# Patient Record
Sex: FEMALE | Race: OTHER | HISPANIC OR LATINO | URBAN - METROPOLITAN AREA
[De-identification: names, ages, dates, MRNs, and addresses within clinical notes are randomized per-mention and may not be internally consistent; named-entity substitution may affect disease eponyms.]

---

## 2018-07-24 ENCOUNTER — OUTPATIENT (OUTPATIENT)
Dept: OUTPATIENT SERVICES | Facility: HOSPITAL | Age: 48
LOS: 1 days | End: 2018-07-24

## 2018-07-24 VITALS
SYSTOLIC BLOOD PRESSURE: 110 MMHG | DIASTOLIC BLOOD PRESSURE: 60 MMHG | RESPIRATION RATE: 16 BRPM | HEIGHT: 65 IN | OXYGEN SATURATION: 98 % | WEIGHT: 184.09 LBS | HEART RATE: 72 BPM | TEMPERATURE: 97 F

## 2018-07-24 DIAGNOSIS — S52.122A DISPLACED FRACTURE OF HEAD OF LEFT RADIUS, INITIAL ENCOUNTER FOR CLOSED FRACTURE: ICD-10-CM

## 2018-07-24 DIAGNOSIS — Z98.890 OTHER SPECIFIED POSTPROCEDURAL STATES: Chronic | ICD-10-CM

## 2018-07-24 LAB
HCG SERPL-ACNC: < 5 MIU/ML — SIGNIFICANT CHANGE UP
HCT VFR BLD CALC: 47.4 % — HIGH (ref 34.5–45)
HGB BLD-MCNC: 15.2 G/DL — SIGNIFICANT CHANGE UP (ref 11.5–15.5)
MCHC RBC-ENTMCNC: 30.6 PG — SIGNIFICANT CHANGE UP (ref 27–34)
MCHC RBC-ENTMCNC: 32.1 % — SIGNIFICANT CHANGE UP (ref 32–36)
MCV RBC AUTO: 95.6 FL — SIGNIFICANT CHANGE UP (ref 80–100)
NRBC # FLD: 0 — SIGNIFICANT CHANGE UP
PLATELET # BLD AUTO: 295 K/UL — SIGNIFICANT CHANGE UP (ref 150–400)
PMV BLD: 10 FL — SIGNIFICANT CHANGE UP (ref 7–13)
RBC # BLD: 4.96 M/UL — SIGNIFICANT CHANGE UP (ref 3.8–5.2)
RBC # FLD: 12.8 % — SIGNIFICANT CHANGE UP (ref 10.3–14.5)
WBC # BLD: 10.42 K/UL — SIGNIFICANT CHANGE UP (ref 3.8–10.5)
WBC # FLD AUTO: 10.42 K/UL — SIGNIFICANT CHANGE UP (ref 3.8–10.5)

## 2018-07-24 NOTE — H&P PST ADULT - PROBLEM SELECTOR PLAN 1
Pt scheduled for left elbow radial head replacement triceps repair on 07/31/18  Preop instructions provided. Pt verbalized understanding.   Pepcid for GI prophylaxis provided   Chlorhexidine wash with instructions provided.

## 2018-07-24 NOTE — H&P PST ADULT - ASSESSMENT
48 y.o female with preop diagnosis of displaced fracture of head of left radius, lateral subluxation of left ulnohumeral joint, strain of extensor muscle, facia and tendon of left index finger at forearm level

## 2018-07-24 NOTE — H&P PST ADULT - HISTORY OF PRESENT ILLNESS
48 y.o. female with no significant PMHx, reports hx of fall 1 month ago and injury to left elbow, s/p ER visit and Xray, diagnosed with displaced fracture of head of left radius, c/o left elbow pain radiating to left arm 10/10, constant swelling to left arm, pt presents to PST for evaluation for Left Elbow Radial Head Replacement Triceps Repair on 07/31/18 48 y.o. female with no significant PMHx, reports hx of slip and fall 1 month ago and injury to left elbow, s/p ER visit and Xray, diagnosed with displaced fracture of head of left radius, c/o left elbow pain radiating to left arm 10/10, swelling to left elbow and arm, pt presents to PST for evaluation for Left Elbow Radial Head Replacement Triceps Repair on 07/31/18

## 2018-07-24 NOTE — H&P PST ADULT - NEGATIVE ENMT SYMPTOMS
no hearing difficulty/no nasal discharge/no nasal congestion/no throat pain/no dysphagia/no sinus symptoms/no post-nasal discharge

## 2018-07-24 NOTE — H&P PST ADULT - MUSCULOSKELETAL COMMENTS
left upper extremity swelling below elbow; pt has sling hx of left elbow fracture, s/p fall ~1 month ago, see HPI left elbow and left upper extremity swelling; pt has sling in place, pt able to wiggle fingers

## 2018-07-24 NOTE — H&P PST ADULT - PRO TOBACCO TYPE
reports smokes only 1-3 x months, socially/cigarettes cigarettes/reports social smoking 1-3 cigarettes months

## 2018-07-24 NOTE — H&P PST ADULT - NEGATIVE GENERAL GENITOURINARY SYMPTOMS
no flank pain L/no hematuria/normal urinary frequency/no flank pain R/no renal colic/no bladder infections/no dysuria

## 2018-07-30 ENCOUNTER — TRANSCRIPTION ENCOUNTER (OUTPATIENT)
Age: 48
End: 2018-07-30

## 2018-07-31 ENCOUNTER — OUTPATIENT (OUTPATIENT)
Dept: OUTPATIENT SERVICES | Facility: HOSPITAL | Age: 48
LOS: 1 days | Discharge: HOME | End: 2018-07-31
Payer: COMMERCIAL

## 2018-07-31 ENCOUNTER — RESULT REVIEW (OUTPATIENT)
Age: 48
End: 2018-07-31

## 2018-07-31 VITALS
TEMPERATURE: 98 F | SYSTOLIC BLOOD PRESSURE: 105 MMHG | HEART RATE: 75 BPM | OXYGEN SATURATION: 99 % | DIASTOLIC BLOOD PRESSURE: 69 MMHG | RESPIRATION RATE: 14 BRPM

## 2018-07-31 VITALS
TEMPERATURE: 96 F | SYSTOLIC BLOOD PRESSURE: 105 MMHG | OXYGEN SATURATION: 98 % | HEIGHT: 65 IN | WEIGHT: 184.09 LBS | HEART RATE: 62 BPM | RESPIRATION RATE: 16 BRPM | DIASTOLIC BLOOD PRESSURE: 71 MMHG

## 2018-07-31 DIAGNOSIS — Z98.890 OTHER SPECIFIED POSTPROCEDURAL STATES: Chronic | ICD-10-CM

## 2018-07-31 DIAGNOSIS — S52.122A DISPLACED FRACTURE OF HEAD OF LEFT RADIUS, INITIAL ENCOUNTER FOR CLOSED FRACTURE: ICD-10-CM

## 2018-07-31 PROCEDURE — 88311 DECALCIFY TISSUE: CPT | Mod: 26

## 2018-07-31 PROCEDURE — 88305 TISSUE EXAM BY PATHOLOGIST: CPT | Mod: 26

## 2018-07-31 NOTE — ASU DISCHARGE PLAN (ADULT/PEDIATRIC). - MEDICATION SUMMARY - MEDICATIONS TO TAKE
I will START or STAY ON the medications listed below when I get home from the hospital:    Percocet 5/325 oral tablet  -- 1-2 tab(s) by mouth every 4-6 hours, As Needed MDD:Maral     ISTOP [id number]  -- Caution federal law prohibits the transfer of this drug to any person other  than the person for whom it was prescribed.  May cause drowsiness.  Alcohol may intensify this effect.  Use care when operating dangerous machinery.  This prescription cannot be refilled.  This product contains acetaminophen.  Do not use  with any other product containing acetaminophen to prevent possible liver damage.  Using more of this medication than prescribed may cause serious breathing problems.    -- Indication: For pain

## 2018-07-31 NOTE — ASU DISCHARGE PLAN (ADULT/PEDIATRIC). - YOU WERE IN THE HOSPITAL FOR:
Left elbow radial head replacement, tricep repair LEFT ELBOW RADIAL HEAD REPLACEMENT; TRICEPS REPAIR, REPAIR OF LIGAMENT

## 2018-07-31 NOTE — ASU DISCHARGE PLAN (ADULT/PEDIATRIC). - ACTIVITY LEVEL
no exercise/no heavy lifting/no weight bearing no sports/gym/no heavy lifting/no exercise/no weight bearing

## 2018-07-31 NOTE — ASU DISCHARGE PLAN (ADULT/PEDIATRIC). - NOTIFY
Bleeding that does not stop Increased Irritability or Sluggishness/Persistent Nausea and Vomiting/Swelling that continues/Unable to Urinate/Pain not relieved by Medications/Numbness, color, or temperature change to extremity/Bleeding that does not stop/Fever greater than 101/Inability to Tolerate Liquids or Foods

## 2018-07-31 NOTE — ASU PREOPERATIVE ASSESSMENT, ADULT (IPARK ONLY) - LANGUAGE ASSISTANCE NEEDED
No-Patient/Caregiver offered and refused free interpretation services./Pt. felt comfortable speaking enough English

## 2018-07-31 NOTE — ASU DISCHARGE PLAN (ADULT/PEDIATRIC). - NURSING INSTRUCTIONS
AVOID TAKING ANY TYLENOL PRODUCTS WHEN TAKING YOUR PAIN MEDICATION. PAIN MEDICATION ALREADY CONTAINS TYLENOL  PAIN MEDICATION MAY CAUSE CONSTIPATION, TAKE AN OVER THE COUNTER STOOL SOFTENER TO ALLEVIATE SYMPTOMS

## 2018-07-31 NOTE — ASU DISCHARGE PLAN (ADULT/PEDIATRIC). - COMMENTS
Patient informed of the below; nothing further.    PLEASE MAKE AN APPOINTMENT TO SEE YOUR DOCTOR IN 2 WEEKS

## 2019-04-22 ENCOUNTER — OUTPATIENT (OUTPATIENT)
Dept: OUTPATIENT SERVICES | Facility: HOSPITAL | Age: 49
LOS: 1 days | End: 2019-04-22
Payer: COMMERCIAL

## 2019-04-22 VITALS
HEART RATE: 63 BPM | HEIGHT: 65 IN | OXYGEN SATURATION: 98 % | TEMPERATURE: 98 F | WEIGHT: 188.5 LBS | DIASTOLIC BLOOD PRESSURE: 75 MMHG | RESPIRATION RATE: 16 BRPM | SYSTOLIC BLOOD PRESSURE: 111 MMHG

## 2019-04-22 DIAGNOSIS — S42.402A UNSPECIFIED FRACTURE OF LOWER END OF LEFT HUMERUS, INITIAL ENCOUNTER FOR CLOSED FRACTURE: ICD-10-CM

## 2019-04-22 DIAGNOSIS — Z98.891 HISTORY OF UTERINE SCAR FROM PREVIOUS SURGERY: Chronic | ICD-10-CM

## 2019-04-22 DIAGNOSIS — Z98.890 OTHER SPECIFIED POSTPROCEDURAL STATES: Chronic | ICD-10-CM

## 2019-04-22 DIAGNOSIS — Z29.9 ENCOUNTER FOR PROPHYLACTIC MEASURES, UNSPECIFIED: ICD-10-CM

## 2019-04-22 DIAGNOSIS — S52.123A DISPLACED FRACTURE OF HEAD OF UNSPECIFIED RADIUS, INITIAL ENCOUNTER FOR CLOSED FRACTURE: Chronic | ICD-10-CM

## 2019-04-22 DIAGNOSIS — M65.822 OTHER SYNOVITIS AND TENOSYNOVITIS, LEFT UPPER ARM: ICD-10-CM

## 2019-04-22 LAB
ANION GAP SERPL CALC-SCNC: 11 MMOL/L — SIGNIFICANT CHANGE UP (ref 5–17)
APPEARANCE UR: CLEAR — SIGNIFICANT CHANGE UP
BILIRUB UR-MCNC: NEGATIVE — SIGNIFICANT CHANGE UP
BUN SERPL-MCNC: 14 MG/DL — SIGNIFICANT CHANGE UP (ref 7–23)
CALCIUM SERPL-MCNC: 9.6 MG/DL — SIGNIFICANT CHANGE UP (ref 8.4–10.5)
CHLORIDE SERPL-SCNC: 107 MMOL/L — SIGNIFICANT CHANGE UP (ref 96–108)
CO2 SERPL-SCNC: 23 MMOL/L — SIGNIFICANT CHANGE UP (ref 22–31)
COLOR SPEC: SIGNIFICANT CHANGE UP
CREAT SERPL-MCNC: 0.89 MG/DL — SIGNIFICANT CHANGE UP (ref 0.5–1.3)
DIFF PNL FLD: SIGNIFICANT CHANGE UP
GLUCOSE SERPL-MCNC: 106 MG/DL — HIGH (ref 70–99)
GLUCOSE UR QL: NEGATIVE — SIGNIFICANT CHANGE UP
HBA1C BLD-MCNC: 6 % — HIGH (ref 4–5.6)
HCT VFR BLD CALC: 46 % — HIGH (ref 34.5–45)
HGB BLD-MCNC: 15.1 G/DL — SIGNIFICANT CHANGE UP (ref 11.5–15.5)
KETONES UR-MCNC: NEGATIVE — SIGNIFICANT CHANGE UP
LEUKOCYTE ESTERASE UR-ACNC: NEGATIVE — SIGNIFICANT CHANGE UP
MCHC RBC-ENTMCNC: 31.3 PG — SIGNIFICANT CHANGE UP (ref 27–34)
MCHC RBC-ENTMCNC: 32.8 GM/DL — SIGNIFICANT CHANGE UP (ref 32–36)
MCV RBC AUTO: 95.2 FL — SIGNIFICANT CHANGE UP (ref 80–100)
NITRITE UR-MCNC: NEGATIVE — SIGNIFICANT CHANGE UP
PH UR: 6.5 — SIGNIFICANT CHANGE UP (ref 5–8)
PLATELET # BLD AUTO: 263 K/UL — SIGNIFICANT CHANGE UP (ref 150–400)
POTASSIUM SERPL-MCNC: 4.1 MMOL/L — SIGNIFICANT CHANGE UP (ref 3.5–5.3)
POTASSIUM SERPL-SCNC: 4.1 MMOL/L — SIGNIFICANT CHANGE UP (ref 3.5–5.3)
PROT UR-MCNC: NEGATIVE — SIGNIFICANT CHANGE UP
RBC # BLD: 4.83 M/UL — SIGNIFICANT CHANGE UP (ref 3.8–5.2)
RBC # FLD: 11.9 % — SIGNIFICANT CHANGE UP (ref 10.3–14.5)
SODIUM SERPL-SCNC: 141 MMOL/L — SIGNIFICANT CHANGE UP (ref 135–145)
SP GR SPEC: 1.01 — SIGNIFICANT CHANGE UP (ref 1.01–1.02)
UROBILINOGEN FLD QL: SIGNIFICANT CHANGE UP
WBC # BLD: 8.28 K/UL — SIGNIFICANT CHANGE UP (ref 3.8–10.5)
WBC # FLD AUTO: 8.28 K/UL — SIGNIFICANT CHANGE UP (ref 3.8–10.5)

## 2019-04-22 PROCEDURE — G0463: CPT

## 2019-04-22 PROCEDURE — 87086 URINE CULTURE/COLONY COUNT: CPT

## 2019-04-22 PROCEDURE — 83036 HEMOGLOBIN GLYCOSYLATED A1C: CPT

## 2019-04-22 PROCEDURE — 81003 URINALYSIS AUTO W/O SCOPE: CPT

## 2019-04-22 PROCEDURE — 80048 BASIC METABOLIC PNL TOTAL CA: CPT

## 2019-04-22 PROCEDURE — 85027 COMPLETE CBC AUTOMATED: CPT

## 2019-04-22 RX ORDER — SODIUM CHLORIDE 9 MG/ML
3 INJECTION INTRAMUSCULAR; INTRAVENOUS; SUBCUTANEOUS EVERY 8 HOURS
Qty: 0 | Refills: 0 | Status: DISCONTINUED | OUTPATIENT
Start: 2019-04-24 | End: 2019-04-24

## 2019-04-22 RX ORDER — CEFAZOLIN SODIUM 1 G
2000 VIAL (EA) INJECTION ONCE
Qty: 0 | Refills: 0 | Status: DISCONTINUED | OUTPATIENT
Start: 2019-04-24 | End: 2019-04-24

## 2019-04-22 NOTE — H&P PST ADULT - NEUROLOGICAL DETAILS
responds to verbal commands/alert and oriented x 3/normal strength sensation intact/alert and oriented x 3/responds to verbal commands/normal strength

## 2019-04-22 NOTE — H&P PST ADULT - NSICDXPASTMEDICALHX_GEN_ALL_CORE_FT
PAST MEDICAL HISTORY:  Displaced fracture of head of left radius PAST MEDICAL HISTORY:  Displaced fracture of head of left radius fracture dislocation of left elbow and avulsion fracture of the coronoid and triceps rupture 6/27/18

## 2019-04-22 NOTE — H&P PST ADULT - MUSCULOSKELETAL
details… detailed exam joint swelling/diminished strength/decreased ROM/joint erythema left elbow/decreased ROM due to pain/decreased ROM

## 2019-04-22 NOTE — H&P PST ADULT - ASSESSMENT
CAPRINI SCORE [CLOT]    AGE RELATED RISK FACTORS                                                       MOBILITY RELATED FACTORS  [ x] Age 41-60 years                                            (1 Point)                  [ ] Bed rest                                                        (1 Point)  [ ] Age: 61-74 years                                           (2 Points)                 [ ] Plaster cast                                                   (2 Points)  [ ] Age= 75 years                                              (3 Points)                 [ ] Bed bound for more than 72 hours                 (2 Points)    DISEASE RELATED RISK FACTORS                                               GENDER SPECIFIC FACTORS  [ ] Edema in the lower extremities                       (1 Point)                  [ ] Pregnancy                                                     (1 Point)  [ ] Varicose veins                                               (1 Point)                  [ ] Post-partum < 6 weeks                                   (1 Point)             [ x] BMI > 25 Kg/m2                                            (1 Point)                  [ ] Hormonal therapy  or oral contraception          (1 Point)                 [ ] Sepsis (in the previous month)                        (1 Point)                  [ ] History of pregnancy complications                 (1 point)  [ ] Pneumonia or serious lung disease                                               [ ] Unexplained or recurrent                     (1 Point)           (in the previous month)                               (1 Point)  [ ] Abnormal pulmonary function test                     (1 Point)                 SURGERY RELATED RISK FACTORS  [ ] Acute myocardial infarction                              (1 Point)                 [ ]  Section                                             (1 Point)  [ ] Congestive heart failure (in the previous month)  (1 Point)               [ ] Minor surgery                                                  (1 Point)   [ ] Inflammatory bowel disease                             (1 Point)                 [ ] Arthroscopic surgery                                        (2 Points)  [ ] Central venous access                                      (2 Points)                [x ] General surgery lasting more than 45 minutes   (2 Points)       [ ] Stroke (in the previous month)                          (5 Points)               [ ] Elective arthroplasty                                         (5 Points)                                                                                                                                               HEMATOLOGY RELATED FACTORS                                                 TRAUMA RELATED RISK FACTORS  [ ] Prior episodes of VTE                                     (3 Points)                [ ] Fracture of the hip, pelvis, or leg                       (5 Points)  [ ] Positive family history for VTE                         (3 Points)                 [ ] Acute spinal cord injury (in the previous month)  (5 Points)  [ ] Prothrombin 63728 A                                     (3 Points)                 [ ] Paralysis  (less than 1 month)                             (5 Points)  [ ] Factor V Leiden                                             (3 Points)                  [ ] Multiple Trauma within 1 month                        (5 Points)  [ ] Lupus anticoagulants                                     (3 Points)                                                           [ ] Anticardiolipin antibodies                               (3 Points)                                                       [ ] High homocysteine in the blood                      (3 Points)                                             [ ] Other congenital or acquired thrombophilia      (3 Points)                                                [ ] Heparin induced thrombocytopenia                  (3 Points)                                          Total Score [      4    ]

## 2019-04-22 NOTE — H&P PST ADULT - SOURCE OF INFORMATION, PROFILE
patient patient/Pt.'s primary language is [place], prefers I communicate in [place] during PST, declined telephonic

## 2019-04-22 NOTE — H&P PST ADULT - NSICDXPROBLEM_GEN_ALL_CORE_FT
PROBLEM DIAGNOSES  Problem: Left elbow fracture  Assessment and Plan: Left elbow contracture release, ulnar nerve release  Pre-op instructions, including chlorhexidine soap, provided - all questions answered    Problem: Prophylactic measure  Assessment and Plan: The Caprini score indicates this patient is at risk for a VTE event (score 3-5).  Most surgical patients in this group would benefit from pharmacologic prophylaxis.  The surgical team will determine the balance between VTE risk and bleeding risk

## 2019-04-22 NOTE — H&P PST ADULT - NSICDXPASTSURGICALHX_GEN_ALL_CORE_FT
PAST SURGICAL HISTORY:  H/O hand surgery left hand 5th finger fracture repair PAST SURGICAL HISTORY:  H/O hand surgery left hand 5th finger fracture repair    Radial head fracture Radial head replacement, repair of coronoid fracture, repair of lateral ligaments, and repair of triceps rupture of the left elbow on 18.    S/P  1990

## 2019-04-22 NOTE — H&P PST ADULT - HISTORY OF PRESENT ILLNESS
48 y.o. female with no significant PMHx, reports hx of slip and fall 1 month ago and injury to left elbow, s/p ER visit and Xray, diagnosed with displaced fracture of head of left radius, c/o left elbow pain radiating to left arm 10/10, swelling to left elbow and arm, pt presents to PST for evaluation for Left Elbow Radial Head Replacement Triceps Repair on 07/31/18 48 yo [place] speaking female, No significant PMH, tripped over a box and suffered a fracture dislocation of left elbow and avulsion fracture of the coronoid and triceps rupture on 6/27/18 s/p Radial head replacement, repair of coronoid fracture, repair of lateral ligaments, and repair of triceps rupture of the left elbow on 7/31/18. Pt. had physical therapy post-op but reports her elbow is locked at bent position, unable to stretch all the way. Pt. returns to [facility] for scheduled Left Elbow Contracture Release, Ulnar Nerve Release on 4/24/19. Pt. being treated for UTI since 4/19/19, had chills over the weekend but denies further dysuria, fever, chest pain, SOB, changes in bowel/habits. Pt. also developed cold sore on left upper lip over the weekend. E-mail was sent to Dr. Schmidt to make him aware.

## 2019-04-22 NOTE — H&P PST ADULT - RS GEN PE MLT RESP DETAILS PC
respirations non-labored/breath sounds equal/airway patent/good air movement/clear to auscultation bilaterally clear to auscultation bilaterally/no wheezes/no rhonchi/no rales/respirations non-labored

## 2019-04-22 NOTE — H&P PST ADULT - NEGATIVE ENMT SYMPTOMS
no ear pain/no nasal congestion/no sinus symptoms/no nasal discharge/no tinnitus/no hearing difficulty/no vertigo

## 2019-04-22 NOTE — H&P PST ADULT - PRO TOBACCO TYPE
cigarettes/reports social smoking 1-3 cigarettes months was a social smoker, 3 cigarettes a month/cigarettes

## 2019-04-23 ENCOUNTER — TRANSCRIPTION ENCOUNTER (OUTPATIENT)
Age: 49
End: 2019-04-23

## 2019-04-23 PROBLEM — S52.122A DISPLACED FRACTURE OF HEAD OF LEFT RADIUS, INITIAL ENCOUNTER FOR CLOSED FRACTURE: Chronic | Status: ACTIVE | Noted: 2018-07-24

## 2019-04-23 LAB
CULTURE RESULTS: SIGNIFICANT CHANGE UP
SPECIMEN SOURCE: SIGNIFICANT CHANGE UP

## 2019-04-24 ENCOUNTER — INPATIENT (INPATIENT)
Facility: HOSPITAL | Age: 49
LOS: 0 days | Discharge: HOME | DRG: 497 | End: 2019-04-25
Attending: ORTHOPAEDIC SURGERY | Admitting: ORTHOPAEDIC SURGERY
Payer: COMMERCIAL

## 2019-04-24 VITALS
HEART RATE: 64 BPM | SYSTOLIC BLOOD PRESSURE: 107 MMHG | HEIGHT: 65 IN | TEMPERATURE: 98 F | RESPIRATION RATE: 16 BRPM | DIASTOLIC BLOOD PRESSURE: 75 MMHG | OXYGEN SATURATION: 97 % | WEIGHT: 188.05 LBS

## 2019-04-24 DIAGNOSIS — Z98.890 OTHER SPECIFIED POSTPROCEDURAL STATES: Chronic | ICD-10-CM

## 2019-04-24 DIAGNOSIS — Z98.891 HISTORY OF UTERINE SCAR FROM PREVIOUS SURGERY: Chronic | ICD-10-CM

## 2019-04-24 DIAGNOSIS — M65.822 OTHER SYNOVITIS AND TENOSYNOVITIS, LEFT UPPER ARM: ICD-10-CM

## 2019-04-24 DIAGNOSIS — S52.123A DISPLACED FRACTURE OF HEAD OF UNSPECIFIED RADIUS, INITIAL ENCOUNTER FOR CLOSED FRACTURE: Chronic | ICD-10-CM

## 2019-04-24 LAB
GLUCOSE BLDC GLUCOMTR-MCNC: 65 MG/DL — LOW (ref 70–99)
HCG UR QL: NEGATIVE — SIGNIFICANT CHANGE UP

## 2019-04-24 RX ORDER — ONDANSETRON 8 MG/1
4 TABLET, FILM COATED ORAL ONCE
Qty: 0 | Refills: 0 | Status: COMPLETED | OUTPATIENT
Start: 2019-04-24 | End: 2019-04-24

## 2019-04-24 RX ORDER — SODIUM CHLORIDE 9 MG/ML
1000 INJECTION INTRAMUSCULAR; INTRAVENOUS; SUBCUTANEOUS
Qty: 0 | Refills: 0 | Status: DISCONTINUED | OUTPATIENT
Start: 2019-04-24 | End: 2019-04-24

## 2019-04-24 RX ORDER — DIPHENHYDRAMINE HCL 50 MG
25 CAPSULE ORAL AT BEDTIME
Qty: 0 | Refills: 0 | Status: DISCONTINUED | OUTPATIENT
Start: 2019-04-24 | End: 2019-04-25

## 2019-04-24 RX ORDER — CHLORHEXIDINE GLUCONATE 213 G/1000ML
1 SOLUTION TOPICAL ONCE
Qty: 0 | Refills: 0 | Status: COMPLETED | OUTPATIENT
Start: 2019-04-24 | End: 2019-04-24

## 2019-04-24 RX ORDER — OXYCODONE HYDROCHLORIDE 5 MG/1
5 TABLET ORAL EVERY 6 HOURS
Qty: 0 | Refills: 0 | Status: DISCONTINUED | OUTPATIENT
Start: 2019-04-24 | End: 2019-04-24

## 2019-04-24 RX ORDER — CEFAZOLIN SODIUM 1 G
2000 VIAL (EA) INJECTION EVERY 8 HOURS
Qty: 0 | Refills: 0 | Status: COMPLETED | OUTPATIENT
Start: 2019-04-24 | End: 2019-04-24

## 2019-04-24 RX ORDER — LIDOCAINE HCL 20 MG/ML
0.2 VIAL (ML) INJECTION ONCE
Qty: 0 | Refills: 0 | Status: COMPLETED | OUTPATIENT
Start: 2019-04-24 | End: 2019-04-24

## 2019-04-24 RX ORDER — HYDROMORPHONE HYDROCHLORIDE 2 MG/ML
0.5 INJECTION INTRAMUSCULAR; INTRAVENOUS; SUBCUTANEOUS
Qty: 0 | Refills: 0 | Status: DISCONTINUED | OUTPATIENT
Start: 2019-04-24 | End: 2019-04-24

## 2019-04-24 RX ORDER — OXYCODONE HYDROCHLORIDE 5 MG/1
10 TABLET ORAL EVERY 4 HOURS
Qty: 0 | Refills: 0 | Status: DISCONTINUED | OUTPATIENT
Start: 2019-04-24 | End: 2019-04-25

## 2019-04-24 RX ORDER — SODIUM CHLORIDE 9 MG/ML
1000 INJECTION INTRAMUSCULAR; INTRAVENOUS; SUBCUTANEOUS
Qty: 0 | Refills: 0 | Status: DISCONTINUED | OUTPATIENT
Start: 2019-04-24 | End: 2019-04-25

## 2019-04-24 RX ORDER — DOCUSATE SODIUM 100 MG
100 CAPSULE ORAL THREE TIMES A DAY
Qty: 0 | Refills: 0 | Status: DISCONTINUED | OUTPATIENT
Start: 2019-04-24 | End: 2019-04-25

## 2019-04-24 RX ORDER — ACETAMINOPHEN 500 MG
650 TABLET ORAL EVERY 6 HOURS
Qty: 0 | Refills: 0 | Status: DISCONTINUED | OUTPATIENT
Start: 2019-04-24 | End: 2019-04-24

## 2019-04-24 RX ORDER — ONDANSETRON 8 MG/1
4 TABLET, FILM COATED ORAL EVERY 6 HOURS
Qty: 0 | Refills: 0 | Status: DISCONTINUED | OUTPATIENT
Start: 2019-04-24 | End: 2019-04-25

## 2019-04-24 RX ORDER — OXYCODONE HYDROCHLORIDE 5 MG/1
5 TABLET ORAL EVERY 4 HOURS
Qty: 0 | Refills: 0 | Status: DISCONTINUED | OUTPATIENT
Start: 2019-04-24 | End: 2019-04-25

## 2019-04-24 RX ORDER — GABAPENTIN 400 MG/1
300 CAPSULE ORAL AT BEDTIME
Qty: 0 | Refills: 0 | Status: DISCONTINUED | OUTPATIENT
Start: 2019-04-24 | End: 2019-04-25

## 2019-04-24 RX ORDER — ACETAMINOPHEN 500 MG
650 TABLET ORAL EVERY 6 HOURS
Qty: 0 | Refills: 0 | Status: DISCONTINUED | OUTPATIENT
Start: 2019-04-24 | End: 2019-04-25

## 2019-04-24 RX ADMIN — CHLORHEXIDINE GLUCONATE 1 APPLICATION(S): 213 SOLUTION TOPICAL at 07:06

## 2019-04-24 RX ADMIN — OXYCODONE HYDROCHLORIDE 10 MILLIGRAM(S): 5 TABLET ORAL at 21:13

## 2019-04-24 RX ADMIN — ONDANSETRON 4 MILLIGRAM(S): 8 TABLET, FILM COATED ORAL at 09:37

## 2019-04-24 RX ADMIN — Medication 100 MILLIGRAM(S): at 15:10

## 2019-04-24 RX ADMIN — OXYCODONE HYDROCHLORIDE 10 MILLIGRAM(S): 5 TABLET ORAL at 15:47

## 2019-04-24 RX ADMIN — SODIUM CHLORIDE 3 MILLILITER(S): 9 INJECTION INTRAMUSCULAR; INTRAVENOUS; SUBCUTANEOUS at 07:06

## 2019-04-24 RX ADMIN — GABAPENTIN 300 MILLIGRAM(S): 400 CAPSULE ORAL at 21:14

## 2019-04-24 RX ADMIN — Medication 100 MILLIGRAM(S): at 21:14

## 2019-04-24 RX ADMIN — OXYCODONE HYDROCHLORIDE 10 MILLIGRAM(S): 5 TABLET ORAL at 21:45

## 2019-04-24 RX ADMIN — SODIUM CHLORIDE 75 MILLILITER(S): 9 INJECTION INTRAMUSCULAR; INTRAVENOUS; SUBCUTANEOUS at 10:33

## 2019-04-24 RX ADMIN — Medication 100 MILLIGRAM(S): at 14:07

## 2019-04-24 RX ADMIN — OXYCODONE HYDROCHLORIDE 10 MILLIGRAM(S): 5 TABLET ORAL at 14:06

## 2019-04-24 RX ADMIN — Medication 0.2 MILLILITER(S): at 07:06

## 2019-04-24 NOTE — PRE-ANESTHESIA EVALUATION ADULT - NSPREOPDXFT_GEN_ALL_CORE
Review of Systems   Constitutional: Positive for fatigue  Negative for appetite change and fever  HENT: Negative  Negative for hearing loss, tinnitus, trouble swallowing and voice change  Eyes: Negative  Negative for photophobia and pain  Respiratory: Negative  Negative for shortness of breath  Cardiovascular: Negative  Negative for palpitations  Gastrointestinal: Negative  Negative for nausea and vomiting  Endocrine: Negative  Negative for cold intolerance and heat intolerance  Genitourinary: Negative  Negative for dysuria, frequency and urgency  Musculoskeletal: Negative  Negative for myalgias and neck pain  Skin: Negative  Negative for rash  Neurological: Positive for headaches  Negative for dizziness, tremors, seizures, syncope, facial asymmetry, speech difficulty, weakness, light-headedness and numbness  Hematological: Negative  Does not bruise/bleed easily  Psychiatric/Behavioral: Positive for confusion and sleep disturbance  Negative for hallucinations  left elbow contracture

## 2019-04-24 NOTE — OCCUPATIONAL THERAPY INITIAL EVALUATION ADULT - RANGE OF MOTION EXAMINATION, UPPER EXTREMITY
Right UE Active ROM was WNL (within normal limits)/PROM L wrist/digits/shoulder WFL (nerve block in place-unable to actively move), PROM L elbow 30-70*

## 2019-04-24 NOTE — OCCUPATIONAL THERAPY INITIAL EVALUATION ADULT - DIAGNOSIS, OT EVAL
Patient presents with decreased ROM ,coordination, strength, impacting ability to perform ADLs and functional mobility

## 2019-04-24 NOTE — OCCUPATIONAL THERAPY INITIAL EVALUATION ADULT - STRENGTHENING, PT EVAL
Patient will increase strength in LUE by 1/2 grade in two weeks to facilitate increased ability to perform ADLs and functional mobility

## 2019-04-24 NOTE — OCCUPATIONAL THERAPY INITIAL EVALUATION ADULT - PERTINENT HX OF CURRENT PROBLEM, REHAB EVAL
50 yo F tripped over a box and suffered a fracture dislocation of left elbow and avulsion fracture of the coronoid and triceps rupture on 6/27/18 s/p Radial head replacement, repair of coronoid fracture, repair of lateral ligaments, and repair of triceps rupture of the left elbow on 7/31/18. Pt. had physical therapy post-op but reports her elbow is locked at bent position, unable to stretch all the way. Pt. returns to [facility] for scheduled L Elbow Contracture Release, Ulnar Nerve Release on 4/24/19.

## 2019-04-24 NOTE — CHART NOTE - NSCHARTNOTEFT_GEN_A_CORE
Patient seen on 7 tower with family at bedside. Resting without complaints.  Denies Chest Pain, SOB, N/V.    T(C): 36.5 (04-24-19 @ 16:27), Max: 36.7 (04-24-19 @ 06:27)  HR: 78 (04-24-19 @ 16:27) (64 - 83)  BP: 110/75 (04-24-19 @ 16:27) (98/57 - 110/75)  RR: 18 (04-24-19 @ 16:27) (12 - 18)  SpO2: 94% (04-24-19 @ 16:27) (91% - 100%)  Wt(kg): --    Exam:  Alert and [place], No Acute Distress  Card: +S1/S2, RRR  Pulm: CTAB  Laterality: L elbow ace wrap c/d/i, in sling  HV x 1; maintaining good suction  Compartments soft, non-tender bilaterally  Fingers warm, brisk capillary refill; unable to assess NV status 2/2 supraclavicular block  + Radial pulse    Xray: Intra-op images in chart             A/P: Patient is a 49y Female S/p L Elbow contracture release, ulnar nerve transposition, and TAM. VSS. NAD  -PT/OT-WBAT to LUE; ROM as tolerated; Sling for comfort  -IS encouraged  -DVT PPx- SCD's  -Pain Control PRN  -FU AM Labs  -OOB to chair  -Continue Current Tx  -Dispo planning- most likely home    Lubna Fox PA-C  Team Pager #[phone]

## 2019-04-24 NOTE — OCCUPATIONAL THERAPY INITIAL EVALUATION ADULT - RANGE OF MOTION, PT EVAL
Pt to tolerate increased PROM to L elbow by 30* within 2 weeks to increased ability to perform ADLs/IADLS

## 2019-04-25 ENCOUNTER — TRANSCRIPTION ENCOUNTER (OUTPATIENT)
Age: 49
End: 2019-04-25

## 2019-04-25 VITALS
RESPIRATION RATE: 16 BRPM | TEMPERATURE: 98 F | SYSTOLIC BLOOD PRESSURE: 113 MMHG | OXYGEN SATURATION: 96 % | DIASTOLIC BLOOD PRESSURE: 68 MMHG | HEART RATE: 74 BPM

## 2019-04-25 LAB
ANION GAP SERPL CALC-SCNC: 10 MMOL/L — SIGNIFICANT CHANGE UP (ref 5–17)
BASOPHILS # BLD AUTO: 0.02 K/UL — SIGNIFICANT CHANGE UP (ref 0–0.2)
BASOPHILS NFR BLD AUTO: 0.1 % — SIGNIFICANT CHANGE UP (ref 0–2)
BUN SERPL-MCNC: 10 MG/DL — SIGNIFICANT CHANGE UP (ref 7–23)
CALCIUM SERPL-MCNC: 8.5 MG/DL — SIGNIFICANT CHANGE UP (ref 8.4–10.5)
CHLORIDE SERPL-SCNC: 107 MMOL/L — SIGNIFICANT CHANGE UP (ref 96–108)
CO2 SERPL-SCNC: 22 MMOL/L — SIGNIFICANT CHANGE UP (ref 22–31)
CREAT SERPL-MCNC: 0.68 MG/DL — SIGNIFICANT CHANGE UP (ref 0.5–1.3)
EOSINOPHIL # BLD AUTO: 0 K/UL — SIGNIFICANT CHANGE UP (ref 0–0.5)
EOSINOPHIL NFR BLD AUTO: 0 % — SIGNIFICANT CHANGE UP (ref 0–6)
GLUCOSE SERPL-MCNC: 130 MG/DL — HIGH (ref 70–99)
HCT VFR BLD CALC: 41.2 % — SIGNIFICANT CHANGE UP (ref 34.5–45)
HGB BLD-MCNC: 13.1 G/DL — SIGNIFICANT CHANGE UP (ref 11.5–15.5)
IMM GRANULOCYTES NFR BLD AUTO: 0.3 % — SIGNIFICANT CHANGE UP (ref 0–1.5)
LYMPHOCYTES # BLD AUTO: 11.8 % — LOW (ref 13–44)
LYMPHOCYTES # BLD AUTO: 2.05 K/UL — SIGNIFICANT CHANGE UP (ref 1–3.3)
MCHC RBC-ENTMCNC: 31 PG — SIGNIFICANT CHANGE UP (ref 27–34)
MCHC RBC-ENTMCNC: 31.8 GM/DL — LOW (ref 32–36)
MCV RBC AUTO: 97.4 FL — SIGNIFICANT CHANGE UP (ref 80–100)
MONOCYTES # BLD AUTO: 1.25 K/UL — HIGH (ref 0–0.9)
MONOCYTES NFR BLD AUTO: 7.2 % — SIGNIFICANT CHANGE UP (ref 2–14)
NEUTROPHILS # BLD AUTO: 13.96 K/UL — HIGH (ref 1.8–7.4)
NEUTROPHILS NFR BLD AUTO: 80.6 % — HIGH (ref 43–77)
PLATELET # BLD AUTO: 225 K/UL — SIGNIFICANT CHANGE UP (ref 150–400)
POTASSIUM SERPL-MCNC: 4.7 MMOL/L — SIGNIFICANT CHANGE UP (ref 3.5–5.3)
POTASSIUM SERPL-SCNC: 4.7 MMOL/L — SIGNIFICANT CHANGE UP (ref 3.5–5.3)
RBC # BLD: 4.23 M/UL — SIGNIFICANT CHANGE UP (ref 3.8–5.2)
RBC # FLD: 12.1 % — SIGNIFICANT CHANGE UP (ref 10.3–14.5)
SODIUM SERPL-SCNC: 139 MMOL/L — SIGNIFICANT CHANGE UP (ref 135–145)
WBC # BLD: 17.34 K/UL — HIGH (ref 3.8–10.5)
WBC # FLD AUTO: 17.34 K/UL — HIGH (ref 3.8–10.5)

## 2019-04-25 PROCEDURE — 80048 BASIC METABOLIC PNL TOTAL CA: CPT

## 2019-04-25 PROCEDURE — 81025 URINE PREGNANCY TEST: CPT

## 2019-04-25 PROCEDURE — 97165 OT EVAL LOW COMPLEX 30 MIN: CPT

## 2019-04-25 PROCEDURE — 85027 COMPLETE CBC AUTOMATED: CPT

## 2019-04-25 PROCEDURE — 97530 THERAPEUTIC ACTIVITIES: CPT

## 2019-04-25 PROCEDURE — 97535 SELF CARE MNGMENT TRAINING: CPT

## 2019-04-25 PROCEDURE — 82962 GLUCOSE BLOOD TEST: CPT

## 2019-04-25 RX ORDER — SODIUM CHLORIDE 9 MG/ML
500 INJECTION INTRAMUSCULAR; INTRAVENOUS; SUBCUTANEOUS ONCE
Qty: 0 | Refills: 0 | Status: COMPLETED | OUTPATIENT
Start: 2019-04-25 | End: 2019-04-25

## 2019-04-25 RX ORDER — ACETAMINOPHEN 500 MG
2 TABLET ORAL
Qty: 0 | Refills: 0 | DISCHARGE
Start: 2019-04-25

## 2019-04-25 RX ORDER — OXYCODONE HYDROCHLORIDE 5 MG/1
1 TABLET ORAL
Qty: 60 | Refills: 0
Start: 2019-04-25

## 2019-04-25 RX ORDER — DOCUSATE SODIUM 100 MG
1 CAPSULE ORAL
Qty: 0 | Refills: 0 | DISCHARGE
Start: 2019-04-25

## 2019-04-25 RX ORDER — TRAMADOL HYDROCHLORIDE 50 MG/1
1 TABLET ORAL
Qty: 0 | Refills: 0 | COMMUNITY

## 2019-04-25 RX ADMIN — OXYCODONE HYDROCHLORIDE 10 MILLIGRAM(S): 5 TABLET ORAL at 13:18

## 2019-04-25 RX ADMIN — Medication 1 TABLET(S): at 13:20

## 2019-04-25 RX ADMIN — SODIUM CHLORIDE 1000 MILLILITER(S): 9 INJECTION INTRAMUSCULAR; INTRAVENOUS; SUBCUTANEOUS at 06:04

## 2019-04-25 RX ADMIN — OXYCODONE HYDROCHLORIDE 10 MILLIGRAM(S): 5 TABLET ORAL at 14:29

## 2019-04-25 RX ADMIN — Medication 100 MILLIGRAM(S): at 06:04

## 2019-04-25 RX ADMIN — SODIUM CHLORIDE 75 MILLILITER(S): 9 INJECTION INTRAMUSCULAR; INTRAVENOUS; SUBCUTANEOUS at 06:04

## 2019-04-25 RX ADMIN — Medication 100 MILLIGRAM(S): at 13:19

## 2019-04-25 NOTE — PROGRESS NOTE ADULT - SUBJECTIVE AND OBJECTIVE BOX
Patient seen and examined. Pain controlled.    Vital Signs Last 24 Hrs  T(C): 36.7 (25 Apr 2019 04:35), Max: 37.1 (24 Apr 2019 21:16)  T(F): 98 (25 Apr 2019 04:35), Max: 98.7 (24 Apr 2019 21:16)  HR: 74 (25 Apr 2019 04:35) (64 - 93)  BP: 91/60 (25 Apr 2019 04:35) (91/60 - 110/75)  BP(mean): 76 (24 Apr 2019 15:00) (72 - 82)  RR: 16 (25 Apr 2019 04:35) (12 - 18)  SpO2: 94% (25 Apr 2019 04:35) (91% - 100%)    Physical exam  Gen: NAD  LUE: Dressing clean, dry, and intact. +HMV.  Pt with minimal weak flexion all digits, limited exam 2/2 block. Decreased sensation entire hand compared to R.  +RA, capillary refill brisk. Compartments soft and nontender.    49F s/p L elbow contracture capsular release/ulnar decompression POD# 1    WBAT LUE, full ROM in sling  Pain control  DVT prophylaxis venodynes  will d/w attending regarding drain output  PT  Discharge planning

## 2019-04-25 NOTE — DISCHARGE NOTE PROVIDER - CARE PROVIDER_API CALL
Dominick Schmidt)  Orthopaedic Surgery  [address] [place] [address], [address]  [place] [address]  Phone: [phone]  Fax: [phone]  Follow Up Time:

## 2019-04-25 NOTE — DISCHARGE NOTE PROVIDER - NSDCFUADDINST_GEN_ALL_CORE_FT
Please follow up with Dr. Schmidt 7-10 days after your discharge from the hospital (call for appointment).  OT-weight bearing as tolerated, full active and passive range of motion allowed.  Please start OT on 4/26/19 (Rx given).  Keep dressing clean and intact, have doctor remove staples post op day 14 and apply steristrips if applicable.  Please follow up with your PMD within 1 month for routine checkup.

## 2019-04-25 NOTE — DISCHARGE NOTE NURSING/CASE MANAGEMENT/SOCIAL WORK - NSDCDPATPORTLINK_GEN_ALL_CORE
You can access the [facility] Portal, offered by [facility], by registering with the following website: [email or web address]

## 2019-04-25 NOTE — DISCHARGE NOTE PROVIDER - NSDCACTIVITY_GEN_ALL_CORE
Walking - Outdoors allowed/Stairs allowed/Walking - Indoors allowed/No heavy lifting/straining/Do not make important decisions/Do not drive or operate machinery/Showering allowed

## 2019-04-25 NOTE — CHART NOTE - NSCHARTNOTEFT_GEN_A_CORE
Patient visited for drain pull. Hemostasis achieved, patient tolerated well, tip intact. 4x4 Dsg placed.    Lavelle Steinberg PA-C  Team Pager: #[phone]

## 2019-04-25 NOTE — DISCHARGE NOTE PROVIDER - HOSPITAL COURSE
This is a 49 year old female with history of L elbow terrible triad with repair admitted to [facility] on 4/24/19 for operative release of elbow contracture with nerve transposition.  Surgery was uncomplicated.  Evaluated and treated by OT, who recommended home with outpatient OT.  Remain of stay unremarkable, and patient discharged home.

## 2019-04-25 NOTE — PROGRESS NOTE ADULT - SUBJECTIVE AND OBJECTIVE BOX
Day _1__ of Anesthesia Pain Management Service    SUBJECTIVE:  Pain Scale Score	At rest: ___ 	With Activity: ___ 	[ x ] Refer to charted pain scores    Supraclavicular Block    MEDICATIONS  (STANDING):  docusate sodium 100 milliGRAM(s) Oral three times a day  gabapentin 300 milliGRAM(s) Oral at bedtime  multivitamin 1 Tablet(s) Oral daily  sodium chloride 0.9%. 1000 milliLiter(s) (75 mL/Hr) IV Continuous <Continuous>    MEDICATIONS  (PRN):  acetaminophen   Tablet .. 650 milliGRAM(s) Oral every 6 hours PRN Temp greater or equal to 38C (100.4F), Mild Pain (1 - 3)  diphenhydrAMINE 25 milliGRAM(s) Oral at bedtime PRN Insomnia  ondansetron Injectable 4 milliGRAM(s) IV Push every 6 hours PRN Nausea and/or Vomiting  oxyCODONE    IR 10 milliGRAM(s) Oral every 4 hours PRN Severe Pain (7 - 10)  oxyCODONE    IR 5 milliGRAM(s) Oral every 4 hours PRN Moderate Pain (4 - 6)      OBJECTIVE:    Assessment of Catheter Site:	[ ] Left	[ ] Right  [ ] Femoral	      [ ] Saphenous   [ ] Supraclavicular   [ ] Other:    [ ] Dressing intact	[x ] Site non-tender	[ x] Site without erythema, discharge, edema  [ ] Epidural tubing and connection checked	[x [ Gross neurological exam within normal limits  [ ] Catheter removed – tip intact		[ ] Afebrile	[ ] Febrile: ___      Vital Signs Last 24 Hrs  T(C): 36.7 (04-25-19 @ 04:35), Max: 37.1 (04-24-19 @ 21:16)  T(F): 98 (04-25-19 @ 04:35), Max: 98.7 (04-24-19 @ 21:16)  HR: 74 (04-25-19 @ 04:35) (67 - 93)  BP: 91/60 (04-25-19 @ 04:35) (91/60 - 110/75)  BP(mean): 76 (04-24-19 @ 15:00) (72 - 82)  RR: 16 (04-25-19 @ 04:35) (12 - 18)  SpO2: 94% (04-25-19 @ 04:35) (91% - 100%)      Sedation Score:	[x ] Alert	[ ] Drowsy	[ ] Arousable  [ ] Asleep  [ ] Unresponsive    Side Effects:	[ x] None	[ ] Nausea	[ ] Vomiting  [ ] Pruritus  		[ ] Weakness  [ ] Numbness  [ ] Other:    ASSESSMENT/ PLAN:    Therapy to  be:	[ ] Continue   [x ] Discontinued   [x ] Change to prn Analgesics    Documentation and Verification of current medications:  [ X ] Done	[ ] Not done, not eligible, reason:    Comments:

## 2019-11-07 ENCOUNTER — OUTPATIENT (OUTPATIENT)
Dept: OUTPATIENT SERVICES | Facility: HOSPITAL | Age: 49
LOS: 1 days | End: 2019-11-07
Payer: COMMERCIAL

## 2019-11-07 VITALS
RESPIRATION RATE: 15 BRPM | HEIGHT: 65 IN | DIASTOLIC BLOOD PRESSURE: 74 MMHG | HEART RATE: 85 BPM | SYSTOLIC BLOOD PRESSURE: 104 MMHG | WEIGHT: 197.09 LBS | OXYGEN SATURATION: 96 % | TEMPERATURE: 99 F

## 2019-11-07 DIAGNOSIS — S52.122A DISPLACED FRACTURE OF HEAD OF LEFT RADIUS, INITIAL ENCOUNTER FOR CLOSED FRACTURE: Chronic | ICD-10-CM

## 2019-11-07 DIAGNOSIS — Z98.890 OTHER SPECIFIED POSTPROCEDURAL STATES: Chronic | ICD-10-CM

## 2019-11-07 DIAGNOSIS — Z98.891 HISTORY OF UTERINE SCAR FROM PREVIOUS SURGERY: Chronic | ICD-10-CM

## 2019-11-07 DIAGNOSIS — S52.123A DISPLACED FRACTURE OF HEAD OF UNSPECIFIED RADIUS, INITIAL ENCOUNTER FOR CLOSED FRACTURE: Chronic | ICD-10-CM

## 2019-11-07 DIAGNOSIS — Z01.818 ENCOUNTER FOR OTHER PREPROCEDURAL EXAMINATION: ICD-10-CM

## 2019-11-07 DIAGNOSIS — S32.309A UNSPECIFIED FRACTURE OF UNSPECIFIED ILIUM, INITIAL ENCOUNTER FOR CLOSED FRACTURE: ICD-10-CM

## 2019-11-07 DIAGNOSIS — M24.522 CONTRACTURE, LEFT ELBOW: ICD-10-CM

## 2019-11-07 LAB
HCT VFR BLD CALC: 44.8 % — SIGNIFICANT CHANGE UP (ref 34.5–45)
HGB BLD-MCNC: 14.6 G/DL — SIGNIFICANT CHANGE UP (ref 11.5–15.5)
MCHC RBC-ENTMCNC: 31.5 PG — SIGNIFICANT CHANGE UP (ref 27–34)
MCHC RBC-ENTMCNC: 32.6 GM/DL — SIGNIFICANT CHANGE UP (ref 32–36)
MCV RBC AUTO: 96.8 FL — SIGNIFICANT CHANGE UP (ref 80–100)
PLATELET # BLD AUTO: 244 K/UL — SIGNIFICANT CHANGE UP (ref 150–400)
RBC # BLD: 4.63 M/UL — SIGNIFICANT CHANGE UP (ref 3.8–5.2)
RBC # FLD: 12.5 % — SIGNIFICANT CHANGE UP (ref 10.3–14.5)
WBC # BLD: 8.55 K/UL — SIGNIFICANT CHANGE UP (ref 3.8–10.5)
WBC # FLD AUTO: 8.55 K/UL — SIGNIFICANT CHANGE UP (ref 3.8–10.5)

## 2019-11-07 PROCEDURE — 85027 COMPLETE CBC AUTOMATED: CPT

## 2019-11-07 PROCEDURE — G0463: CPT

## 2019-11-07 RX ORDER — CEFAZOLIN SODIUM 1 G
2000 VIAL (EA) INJECTION ONCE
Refills: 0 | Status: DISCONTINUED | OUTPATIENT
Start: 2019-11-13 | End: 2019-11-28

## 2019-11-07 RX ORDER — CHLORHEXIDINE GLUCONATE 213 G/1000ML
1 SOLUTION TOPICAL ONCE
Refills: 0 | Status: DISCONTINUED | OUTPATIENT
Start: 2019-11-13 | End: 2019-11-28

## 2019-11-07 RX ORDER — CIPROFLOXACIN LACTATE 400MG/40ML
1 VIAL (ML) INTRAVENOUS
Qty: 0 | Refills: 0 | DISCHARGE

## 2019-11-07 RX ORDER — IBUPROFEN 200 MG
1 TABLET ORAL
Qty: 0 | Refills: 0 | DISCHARGE

## 2019-11-07 RX ORDER — GABAPENTIN 400 MG/1
1 CAPSULE ORAL
Qty: 0 | Refills: 0 | DISCHARGE

## 2019-11-07 RX ORDER — SODIUM CHLORIDE 9 MG/ML
3 INJECTION INTRAMUSCULAR; INTRAVENOUS; SUBCUTANEOUS EVERY 8 HOURS
Refills: 0 | Status: DISCONTINUED | OUTPATIENT
Start: 2019-11-13 | End: 2019-11-28

## 2019-11-07 RX ORDER — LIDOCAINE HCL 20 MG/ML
0.2 VIAL (ML) INJECTION ONCE
Refills: 0 | Status: DISCONTINUED | OUTPATIENT
Start: 2019-11-13 | End: 2019-11-28

## 2019-11-07 NOTE — H&P PST ADULT - NSICDXPASTMEDICALHX_GEN_ALL_CORE_FT
PAST MEDICAL HISTORY:  Contracture, left elbow     Displaced fracture of head of left radius fracture dislocation of left elbow and avulsion fracture of the coronoid and triceps rupture 6/27/18    Low back pain

## 2019-11-07 NOTE — H&P PST ADULT - NSICDXPROBLEM_GEN_ALL_CORE_FT
PROBLEM DIAGNOSES  Problem: Contracture, left elbow  Assessment and Plan: Manipulation under anesthesia   urine preg DOS

## 2019-11-07 NOTE — H&P PST ADULT - NSICDXFAMILYHX_GEN_ALL_CORE_FT
FAMILY HISTORY:  Father  Still living? Unknown  Family history of stomach cancer, Age at diagnosis: Age Unknown

## 2019-11-07 NOTE — H&P PST ADULT - HISTORY OF PRESENT ILLNESS
48 yo [place] speaking female, No significant PMH, tripped over a box and suffered a fracture dislocation of left elbow and avulsion fracture of the coronoid and triceps rupture on 6/27/18 s/p Radial head replacement, repair of coronoid fracture, repair of lateral ligaments, and repair of triceps rupture of the left elbow on 7/31/18. Pt. had physical therapy post-op but reports her elbow is locked at bent position, unable to stretch all the way. Pt. returns to [facility] for scheduled Left Elbow Contracture Release, Ulnar Nerve Release on 4/24/19. Pt. being treated for UTI since 4/19/19, had chills over the weekend but denies further dysuria, fever, chest pain, SOB, changes in bowel/habits. Pt. also developed cold sore on left upper lip over the weekend. E-mail was sent to Dr. Schmidt to make him aware. 50 y/o female with no significant past medical history, s/p left elbow fracture in 6/2018 s/p radial head repair in 7/2018 and contracture release in 4/2019. Pt states she continues to have pain and is unable to fully extend her left elbow. Presents now for manipulation of left elbow under anesthesia.

## 2019-11-07 NOTE — H&P PST ADULT - NSICDXPASTSURGICALHX_GEN_ALL_CORE_FT
PAST SURGICAL HISTORY:  H/O hand surgery left hand 5th finger fracture repair    Radial head fracture Radial head replacement, repair of coronoid fracture, repair of lateral ligaments, and repair of triceps rupture of the left elbow on 18.    S/P      S/P decompression of ulnar nerve at elbow 2019; contracture release

## 2019-11-12 ENCOUNTER — TRANSCRIPTION ENCOUNTER (OUTPATIENT)
Age: 49
End: 2019-11-12

## 2019-11-13 ENCOUNTER — OUTPATIENT (OUTPATIENT)
Dept: OUTPATIENT SERVICES | Facility: HOSPITAL | Age: 49
LOS: 1 days | End: 2019-11-13
Payer: COMMERCIAL

## 2019-11-13 VITALS — RESPIRATION RATE: 20 BRPM | HEART RATE: 82 BPM | OXYGEN SATURATION: 95 %

## 2019-11-13 VITALS
SYSTOLIC BLOOD PRESSURE: 118 MMHG | RESPIRATION RATE: 18 BRPM | DIASTOLIC BLOOD PRESSURE: 83 MMHG | OXYGEN SATURATION: 97 % | TEMPERATURE: 98 F | HEART RATE: 80 BPM | WEIGHT: 197.09 LBS | HEIGHT: 65 IN

## 2019-11-13 DIAGNOSIS — Z98.891 HISTORY OF UTERINE SCAR FROM PREVIOUS SURGERY: Chronic | ICD-10-CM

## 2019-11-13 DIAGNOSIS — S32.309A UNSPECIFIED FRACTURE OF UNSPECIFIED ILIUM, INITIAL ENCOUNTER FOR CLOSED FRACTURE: ICD-10-CM

## 2019-11-13 DIAGNOSIS — Z98.890 OTHER SPECIFIED POSTPROCEDURAL STATES: Chronic | ICD-10-CM

## 2019-11-13 DIAGNOSIS — S52.123A DISPLACED FRACTURE OF HEAD OF UNSPECIFIED RADIUS, INITIAL ENCOUNTER FOR CLOSED FRACTURE: Chronic | ICD-10-CM

## 2019-11-13 LAB — HCG UR QL: NEGATIVE — SIGNIFICANT CHANGE UP

## 2019-11-13 PROCEDURE — 24300 MNPJ ELBOW UNDER ANES: CPT | Mod: LT

## 2019-11-13 PROCEDURE — 81025 URINE PREGNANCY TEST: CPT

## 2019-11-13 RX ORDER — HYDROMORPHONE HYDROCHLORIDE 2 MG/ML
0.5 INJECTION INTRAMUSCULAR; INTRAVENOUS; SUBCUTANEOUS
Refills: 0 | Status: DISCONTINUED | OUTPATIENT
Start: 2019-11-13 | End: 2019-11-13

## 2019-11-13 RX ORDER — OXYCODONE HYDROCHLORIDE 5 MG/1
5 TABLET ORAL ONCE
Refills: 0 | Status: DISCONTINUED | OUTPATIENT
Start: 2019-11-13 | End: 2019-11-13

## 2019-11-13 RX ORDER — ONDANSETRON 8 MG/1
4 TABLET, FILM COATED ORAL ONCE
Refills: 0 | Status: DISCONTINUED | OUTPATIENT
Start: 2019-11-13 | End: 2019-11-13

## 2019-11-13 RX ADMIN — OXYCODONE HYDROCHLORIDE 5 MILLIGRAM(S): 5 TABLET ORAL at 08:58

## 2019-11-13 NOTE — PRE-ANESTHESIA EVALUATION ADULT - NS MD HP INPLANTS MED DEV
Spoke with daughter and made appt for Tuesday 5-14-19 at 1 pm in radiation oncology.   left elbow hardware

## 2019-11-13 NOTE — ASU DISCHARGE PLAN (ADULT/PEDIATRIC) - ASU DC SPECIAL INSTRUCTIONSFT
Follow up with Dr. Schmidt in 10-14 days. Call office for appointment. Take medications as prescribed. . Rest, ice, and elevate affected extremity. Encouraged to move the elbow and range the elbow as tolerated.

## 2019-11-13 NOTE — ASU PREOP CHECKLIST - LATEX ALLERGY
After Visit Summary   6/7/2017    Camacho Bhagat    MRN: [id number]           Patient Information     Date Of Birth          1962        Visit Information        Provider Department      6/7/2017 12:45 PM Toñito Camejo MD [facility] Hepatology        Today's Diagnoses     Liver transplant recipient (H)           Follow-ups after your visit        Follow-up notes from your care team     Return in about 6 weeks (around 7/19/2017).      Your next 10 appointments already scheduled     Jun 12, 2017 11:30 AM CDT   (Arrive by 11:15 AM)   Liver Return Post Op with Jovan Tran MD   [facility] Solid Organ Transplant ([facility])    [address] [place] [id number] [phone]            Jul 28, 2017 11:15 AM CDT   (Arrive by 11:00 AM)   Return General Liver with Toñito Camejo MD   [facility] Hepatology ([facility])    [address] [place] [id number] [phone]              Who to contact     If you have questions or need follow up information about today's clinic visit or your schedule please contact [facility]PATOLOG[facility] directly at [phone].  Normal or non-critical lab and imaging results will be communicated to you by MyChart, letter or phone within 4 business days after the clinic has received the results. If you do not hear from us within 7 days, please contact the clinic through [employer] or phone. If you have a critical or abnormal lab result, we will notify you by phone as soon as possible.  Submit refill requests through [employer] or call your pharmacy and they will forward the refill request to us. Please allow 3 business days for your refill to be completed.          Additional Information About Your Visit        [employer] Information     [employer] gives you secure access to your electronic health record. If you see a primary care provider, you can also send messages to your care team and make  appointments. If you have questions, please call your primary care clinic.  If you do not have a primary care provider, please call [phone] and they will assist you.        Care EveryWhere ID     This is your Care EveryWhere ID. This could be used by other organizations to access your [facility] medical records  [id number]        Your Vitals Were     Pulse Temperature Height Pulse Oximetry BMI (Body Mass Index)       94 98.2  F (36.8  C) (Oral) 1.829 m (6') 100% 29.29 kg/m2        Blood Pressure from Last 3 Encounters:   06/07/17 (!) 199/103   06/06/17 (!) 185/115   05/11/17 (!) 164/96    Weight from Last 3 Encounters:   06/07/17 98 kg (216 lb)   05/11/17 101.7 kg (224 lb 4.8 oz)   05/04/17 105.7 kg (233 lb)                 Today's Medication Changes          These changes are accurate as of: 6/7/17 11:59 PM.  If you have any questions, ask your nurse or doctor.               These medicines have changed or have updated prescriptions.        Dose/Directions    insulin glargine 100 UNIT/ML injection   Commonly known as:  LANTUS   This may have changed:  how much to take   Used for:  Liver transplant recipient (H)        Dose:  45 Units   Inject 45 Units Subcutaneous every 24 hours   Quantity:  3 mL   Refills:  3       * oxyCODONE 5 MG IR tablet   Commonly known as:  ROXICODONE   This may have changed:  Another medication with the same name was added. Make sure you understand how and when to take each.   Used for:  Liver transplant recipient (H)        Dose:  5-10 mg   Take 1-2 tablets (5-10 mg) by mouth every 4 hours as needed for moderate to severe pain   Quantity:  40 tablet   Refills:  0       * oxyCODONE 10 MG IR tablet   Commonly known as:  ROXICODONE   This may have changed:  You were already taking a medication with the same name, and this prescription was added. Make sure you understand how and when to take each.   Used for:  Osteoarthritis   Changed by:  Shay Kirkpatrick MD        Dose:  10 mg   Take 1  tablet (10 mg) by mouth daily as needed for moderate to severe pain   Quantity:  30 tablet   Refills:  0       * Notice:  This list has 2 medication(s) that are the same as other medications prescribed for you. Read the directions carefully, and ask your doctor or other care provider to review them with you.         Where to get your medicines      Some of these will need a paper prescription and others can be bought over the counter.  Ask your nurse if you have questions.     Bring a paper prescription for each of these medications     oxyCODONE 10 MG IR tablet                Primary Care Provider Office Phone # Fax #    Shay Kirkpatrick [phone]        [facility] [address] [facility] [address]  [facility] [address]        Thank you!     Thank you for choosing [facility] HEPATOLOG[facility]  for your care. Our goal is always to provide you with excellent care. Hearing back from our patients is one way we can continue to improve our services. Please take a few minutes to complete the written survey that you may receive in the mail after your visit with us. Thank you!             Your Updated Medication List - Protect others around you: Learn how to safely use, store and throw away your medicines at www.disposemymeds.org.          This list is accurate as of: 6/7/17 11:59 PM.  Always use your most recent med list.                   Brand Name Dispense Instructions for use    aspirin 325 MG tablet     180 tablet    1 tablet (325 mg) by Oral or Feeding Tube route daily       clotrimazole 10 MG Lozg lozenge    MYCELEX    120 each    Place 1 lozenge (1 Beatrice) inside cheek 4 times daily       cyclobenzaprine 10 MG tablet    FLEXERIL    90 tablet    Take 1 tablet (10 mg) by mouth 3 times daily as needed for muscle spasms       gabapentin 300 MG capsule    NEURONTIN    90 capsule    Take 1 capsule (300 mg) by mouth 3 times daily       glucagon 1 MG Solr injection     1 each    Inject 1 mg Subcutaneous every 15 minutes as  needed for low blood sugar (May repeat x 1 only)       insulin aspart 100 UNIT/ML injection    NovoLOG PEN    3 mL    DOSE:  1 units per 8 grams of carbohydrate. With meals and snacks. Only chart total amount of units given.  Do not give if pre-prandial glucose is less than 60 mg/dL.       insulin glargine 100 UNIT/ML injection    LANTUS    3 mL    Inject 45 Units Subcutaneous every 24 hours       LACTOBACILLUS EXTRA STRENGTH Caps     30 capsule    Take 1 capsule by mouth 2 times daily       lidocaine 5 % Patch    LIDODERM    30 patch    Place 1 patch onto the skin every 24 hours       magnesium oxide 400 MG tablet    MAG-OX    7 tablet    Take 1 tablet (400 mg) by mouth 2 times daily       multivitamin, therapeutic with minerals Tabs tablet     30 each    Take 1 tablet by mouth daily       mycophenolate 250 MG capsule    CELLCEPT - GENERIC EQUIVALENT    90 capsule    Take 3 capsules (750 mg) by mouth every 12 hours       omeprazole 20 MG CR capsule    priLOSEC    60 capsule    Take 1 capsule (20 mg) by mouth 2 times daily (before meals)       ondansetron 4 MG ODT tab    ZOFRAN-ODT    30 tablet    Take 1 tablet (4 mg) by mouth every 8 hours as needed for nausea       * oxyCODONE 5 MG IR tablet    ROXICODONE    40 tablet    Take 1-2 tablets (5-10 mg) by mouth every 4 hours as needed for moderate to severe pain       * oxyCODONE 10 MG IR tablet    ROXICODONE    30 tablet    Take 1 tablet (10 mg) by mouth daily as needed for moderate to severe pain       prochlorperazine 5 MG tablet    COMPAZINE    90 tablet    Take 1-2 tablets (5-10 mg) by mouth every 6 hours as needed for nausea or vomiting       sulfamethoxazole-trimethoprim 400-80 MG per tablet    BACTRIM/SEPTRA    30 tablet    Take 1 tablet by mouth daily       tacrolimus 1 MG capsule    PROGRAF - GENERIC EQUIVALENT    360 capsule    Take 6 capsules (6 mg) by mouth every 12 hours       * tadalafil 5 MG tablet    CIALIS    30 tablet    Take 1 tablet (5 mg) by mouth  daily Never use with nitroglycerin, terazosin or doxazosin.       * tadalafil 5 MG tablet    CIALIS    30 tablet    Take 1 tablet (5 mg) by mouth daily Never use with nitroglycerin, terazosin or doxazosin.       TRADJENTA PO      Take 5 mg by mouth       valGANciclovir 450 MG tablet    VALCYTE    60 tablet    Take 1 tablet (450 mg) by mouth daily       * Notice:  This list has 4 medication(s) that are the same as other medications prescribed for you. Read the directions carefully, and ask your doctor or other care provider to review them with you.       no

## 2019-11-13 NOTE — ASU DISCHARGE PLAN (ADULT/PEDIATRIC) - CARE PROVIDER_API CALL
Dominick Schmidt)  Orthopaedic Surgery  [address] [place] [address], [address]  [place] [address]  Phone: [phone]  Fax: [phone]  Follow Up Time:

## 2020-11-10 PROBLEM — M54.5 LOW BACK PAIN: Chronic | Status: ACTIVE | Noted: 2019-11-07

## 2020-11-10 PROBLEM — M24.522 CONTRACTURE, LEFT ELBOW: Chronic | Status: ACTIVE | Noted: 2019-11-07

## 2020-11-11 ENCOUNTER — OUTPATIENT (OUTPATIENT)
Dept: OUTPATIENT SERVICES | Facility: HOSPITAL | Age: 50
LOS: 1 days | End: 2020-11-11
Payer: COMMERCIAL

## 2020-11-11 VITALS
SYSTOLIC BLOOD PRESSURE: 110 MMHG | TEMPERATURE: 98 F | WEIGHT: 195.99 LBS | RESPIRATION RATE: 16 BRPM | HEART RATE: 78 BPM | HEIGHT: 64 IN | DIASTOLIC BLOOD PRESSURE: 78 MMHG | OXYGEN SATURATION: 95 %

## 2020-11-11 DIAGNOSIS — M24.522 CONTRACTURE, LEFT ELBOW: ICD-10-CM

## 2020-11-11 DIAGNOSIS — Z01.818 ENCOUNTER FOR OTHER PREPROCEDURAL EXAMINATION: ICD-10-CM

## 2020-11-11 DIAGNOSIS — E11.9 TYPE 2 DIABETES MELLITUS WITHOUT COMPLICATIONS: ICD-10-CM

## 2020-11-11 DIAGNOSIS — S52.123A DISPLACED FRACTURE OF HEAD OF UNSPECIFIED RADIUS, INITIAL ENCOUNTER FOR CLOSED FRACTURE: Chronic | ICD-10-CM

## 2020-11-11 DIAGNOSIS — M65.822 OTHER SYNOVITIS AND TENOSYNOVITIS, LEFT UPPER ARM: ICD-10-CM

## 2020-11-11 DIAGNOSIS — Z98.890 OTHER SPECIFIED POSTPROCEDURAL STATES: Chronic | ICD-10-CM

## 2020-11-11 DIAGNOSIS — Z98.891 HISTORY OF UTERINE SCAR FROM PREVIOUS SURGERY: Chronic | ICD-10-CM

## 2020-11-11 LAB
ANION GAP SERPL CALC-SCNC: 11 MMOL/L — SIGNIFICANT CHANGE UP (ref 5–17)
BUN SERPL-MCNC: 7 MG/DL — SIGNIFICANT CHANGE UP (ref 7–23)
CALCIUM SERPL-MCNC: 9.3 MG/DL — SIGNIFICANT CHANGE UP (ref 8.4–10.5)
CHLORIDE SERPL-SCNC: 99 MMOL/L — SIGNIFICANT CHANGE UP (ref 96–108)
CO2 SERPL-SCNC: 22 MMOL/L — SIGNIFICANT CHANGE UP (ref 22–31)
CREAT SERPL-MCNC: 0.55 MG/DL — SIGNIFICANT CHANGE UP (ref 0.5–1.3)
GLUCOSE SERPL-MCNC: 399 MG/DL — HIGH (ref 70–99)
HCT VFR BLD CALC: 41.1 % — SIGNIFICANT CHANGE UP (ref 34.5–45)
HGB BLD-MCNC: 13.8 G/DL — SIGNIFICANT CHANGE UP (ref 11.5–15.5)
MCHC RBC-ENTMCNC: 31.9 PG — SIGNIFICANT CHANGE UP (ref 27–34)
MCHC RBC-ENTMCNC: 33.6 GM/DL — SIGNIFICANT CHANGE UP (ref 32–36)
MCV RBC AUTO: 95.1 FL — SIGNIFICANT CHANGE UP (ref 80–100)
NRBC # BLD: 0 /100 WBCS — SIGNIFICANT CHANGE UP (ref 0–0)
PLATELET # BLD AUTO: 178 K/UL — SIGNIFICANT CHANGE UP (ref 150–400)
POTASSIUM SERPL-MCNC: 4.4 MMOL/L — SIGNIFICANT CHANGE UP (ref 3.5–5.3)
POTASSIUM SERPL-SCNC: 4.4 MMOL/L — SIGNIFICANT CHANGE UP (ref 3.5–5.3)
RBC # BLD: 4.32 M/UL — SIGNIFICANT CHANGE UP (ref 3.8–5.2)
RBC # FLD: 11.9 % — SIGNIFICANT CHANGE UP (ref 10.3–14.5)
SODIUM SERPL-SCNC: 132 MMOL/L — LOW (ref 135–145)
WBC # BLD: 7.25 K/UL — SIGNIFICANT CHANGE UP (ref 3.8–10.5)
WBC # FLD AUTO: 7.25 K/UL — SIGNIFICANT CHANGE UP (ref 3.8–10.5)

## 2020-11-11 PROCEDURE — 83036 HEMOGLOBIN GLYCOSYLATED A1C: CPT

## 2020-11-11 PROCEDURE — 80048 BASIC METABOLIC PNL TOTAL CA: CPT

## 2020-11-11 PROCEDURE — G0463: CPT

## 2020-11-11 PROCEDURE — 85027 COMPLETE CBC AUTOMATED: CPT

## 2020-11-11 RX ORDER — SODIUM CHLORIDE 9 MG/ML
3 INJECTION INTRAMUSCULAR; INTRAVENOUS; SUBCUTANEOUS EVERY 8 HOURS
Refills: 0 | Status: DISCONTINUED | OUTPATIENT
Start: 2020-11-18 | End: 2020-11-18

## 2020-11-11 RX ORDER — CEFAZOLIN SODIUM 1 G
2000 VIAL (EA) INJECTION ONCE
Refills: 0 | Status: COMPLETED | OUTPATIENT
Start: 2020-11-18 | End: 2020-11-18

## 2020-11-11 RX ORDER — LIDOCAINE HCL 20 MG/ML
0.2 VIAL (ML) INJECTION ONCE
Refills: 0 | Status: DISCONTINUED | OUTPATIENT
Start: 2020-11-18 | End: 2020-11-18

## 2020-11-11 NOTE — H&P PST ADULT - NSICDXPASTMEDICALHX_GEN_ALL_CORE_FT
PAST MEDICAL HISTORY:  Contracture, left elbow     Displaced fracture of head of left radius fracture dislocation of left elbow and avulsion fracture of the coronoid and triceps rupture 6/27/18    Low back pain     T2DM (type 2 diabetes mellitus)

## 2020-11-11 NOTE — H&P PST ADULT - HISTORY OF PRESENT ILLNESS
49 y/o female with h/o T2DM c/o left elbow fracture in 6/2018 s/p radial head repair in 7/2018 and contracture release in 4/2019, s/p manipulation of left elbow under anesthesia 11/2019. Pt states she continues to have pain and is unable to fully extend her left elbow. Today she presents to PST for scheduled Revision of Left Elbow Contraction Release on 11/18/20. Denies any palpitations, SOB, N/V, fever or chills.   ***COVID swab scheduled for 11/16/20***

## 2020-11-11 NOTE — H&P PST ADULT - NSICDXPASTSURGICALHX_GEN_ALL_CORE_FT
PAST SURGICAL HISTORY:  H/O elbow surgery 2019 manipulation of left elbow under anesthesia    H/O hand surgery left hand 5th finger fracture repair    Radial head fracture Radial head replacement, repair of coronoid fracture, repair of lateral ligaments, and repair of triceps rupture of the left elbow on 18.    S/P      S/P decompression of ulnar nerve at elbow 2019; contracture release

## 2020-11-11 NOTE — H&P PST ADULT - NSICDXPROBLEM_GEN_ALL_CORE_FT
PROBLEM DIAGNOSES  Problem: Contracture, left elbow  Assessment and Plan: Revision of Left Elbow Contraction Release on 11/18/20  Pre-op education provided - all questions answered. Pt verbalized understanding     Problem: T2DM (type 2 diabetes mellitus)  Assessment and Plan: Finger stick on day of surgery   Hold Metformin 24 frs prior to surgery

## 2020-11-12 LAB
A1C WITH ESTIMATED AVERAGE GLUCOSE RESULT: 9.2 % — HIGH (ref 4–5.6)
ESTIMATED AVERAGE GLUCOSE: 217 MG/DL — HIGH (ref 68–114)

## 2020-11-16 ENCOUNTER — OUTPATIENT (OUTPATIENT)
Dept: OUTPATIENT SERVICES | Facility: HOSPITAL | Age: 50
LOS: 1 days | End: 2020-11-16
Payer: COMMERCIAL

## 2020-11-16 DIAGNOSIS — Z98.890 OTHER SPECIFIED POSTPROCEDURAL STATES: Chronic | ICD-10-CM

## 2020-11-16 DIAGNOSIS — Z98.891 HISTORY OF UTERINE SCAR FROM PREVIOUS SURGERY: Chronic | ICD-10-CM

## 2020-11-16 DIAGNOSIS — S52.123A DISPLACED FRACTURE OF HEAD OF UNSPECIFIED RADIUS, INITIAL ENCOUNTER FOR CLOSED FRACTURE: Chronic | ICD-10-CM

## 2020-11-16 DIAGNOSIS — Z11.59 ENCOUNTER FOR SCREENING FOR OTHER VIRAL DISEASES: ICD-10-CM

## 2020-11-16 LAB — SARS-COV-2 RNA SPEC QL NAA+PROBE: SIGNIFICANT CHANGE UP

## 2020-11-16 PROCEDURE — U0003: CPT

## 2020-11-17 ENCOUNTER — TRANSCRIPTION ENCOUNTER (OUTPATIENT)
Age: 50
End: 2020-11-17

## 2020-11-18 ENCOUNTER — INPATIENT (INPATIENT)
Facility: HOSPITAL | Age: 50
LOS: 2 days | Discharge: HOME | DRG: 512 | End: 2020-11-21
Attending: ORTHOPAEDIC SURGERY | Admitting: ORTHOPAEDIC SURGERY
Payer: COMMERCIAL

## 2020-11-18 VITALS
DIASTOLIC BLOOD PRESSURE: 82 MMHG | OXYGEN SATURATION: 96 % | HEIGHT: 64 IN | HEART RATE: 89 BPM | TEMPERATURE: 98 F | WEIGHT: 195.99 LBS | SYSTOLIC BLOOD PRESSURE: 113 MMHG | RESPIRATION RATE: 16 BRPM

## 2020-11-18 DIAGNOSIS — Z98.890 OTHER SPECIFIED POSTPROCEDURAL STATES: Chronic | ICD-10-CM

## 2020-11-18 DIAGNOSIS — Z98.891 HISTORY OF UTERINE SCAR FROM PREVIOUS SURGERY: Chronic | ICD-10-CM

## 2020-11-18 DIAGNOSIS — Z01.818 ENCOUNTER FOR OTHER PREPROCEDURAL EXAMINATION: ICD-10-CM

## 2020-11-18 DIAGNOSIS — M65.822 OTHER SYNOVITIS AND TENOSYNOVITIS, LEFT UPPER ARM: ICD-10-CM

## 2020-11-18 DIAGNOSIS — S52.123A DISPLACED FRACTURE OF HEAD OF UNSPECIFIED RADIUS, INITIAL ENCOUNTER FOR CLOSED FRACTURE: Chronic | ICD-10-CM

## 2020-11-18 LAB
GLUCOSE BLDC GLUCOMTR-MCNC: 180 MG/DL — HIGH (ref 70–99)
GLUCOSE BLDC GLUCOMTR-MCNC: 201 MG/DL — HIGH (ref 70–99)
GLUCOSE BLDC GLUCOMTR-MCNC: 220 MG/DL — HIGH (ref 70–99)
GLUCOSE BLDC GLUCOMTR-MCNC: 263 MG/DL — HIGH (ref 70–99)
GLUCOSE BLDC GLUCOMTR-MCNC: 347 MG/DL — HIGH (ref 70–99)
HCG UR QL: NEGATIVE — SIGNIFICANT CHANGE UP

## 2020-11-18 RX ORDER — ONDANSETRON 8 MG/1
4 TABLET, FILM COATED ORAL ONCE
Refills: 0 | Status: DISCONTINUED | OUTPATIENT
Start: 2020-11-18 | End: 2020-11-18

## 2020-11-18 RX ORDER — HYDROMORPHONE HYDROCHLORIDE 2 MG/ML
0.5 INJECTION INTRAMUSCULAR; INTRAVENOUS; SUBCUTANEOUS
Refills: 0 | Status: DISCONTINUED | OUTPATIENT
Start: 2020-11-18 | End: 2020-11-18

## 2020-11-18 RX ORDER — INSULIN LISPRO 100/ML
VIAL (ML) SUBCUTANEOUS
Refills: 0 | Status: DISCONTINUED | OUTPATIENT
Start: 2020-11-18 | End: 2020-11-18

## 2020-11-18 RX ORDER — DEXTROSE 50 % IN WATER 50 %
12.5 SYRINGE (ML) INTRAVENOUS ONCE
Refills: 0 | Status: DISCONTINUED | OUTPATIENT
Start: 2020-11-18 | End: 2020-11-18

## 2020-11-18 RX ORDER — SODIUM CHLORIDE 9 MG/ML
1000 INJECTION, SOLUTION INTRAVENOUS
Refills: 0 | Status: ACTIVE | OUTPATIENT
Start: 2020-11-18 | End: 2021-10-17

## 2020-11-18 RX ORDER — DEXTROSE 50 % IN WATER 50 %
25 SYRINGE (ML) INTRAVENOUS ONCE
Refills: 0 | Status: DISCONTINUED | OUTPATIENT
Start: 2020-11-18 | End: 2020-11-21

## 2020-11-18 RX ORDER — NALOXONE HYDROCHLORIDE 4 MG/.1ML
0.1 SPRAY NASAL
Refills: 0 | Status: DISCONTINUED | OUTPATIENT
Start: 2020-11-18 | End: 2020-11-19

## 2020-11-18 RX ORDER — DEXTROSE 50 % IN WATER 50 %
25 SYRINGE (ML) INTRAVENOUS ONCE
Refills: 0 | Status: DISCONTINUED | OUTPATIENT
Start: 2020-11-18 | End: 2020-11-18

## 2020-11-18 RX ORDER — CHLORHEXIDINE GLUCONATE 213 G/1000ML
1 SOLUTION TOPICAL ONCE
Refills: 0 | Status: COMPLETED | OUTPATIENT
Start: 2020-11-18 | End: 2020-11-18

## 2020-11-18 RX ORDER — SODIUM CHLORIDE 9 MG/ML
1000 INJECTION, SOLUTION INTRAVENOUS
Refills: 0 | Status: DISCONTINUED | OUTPATIENT
Start: 2020-11-18 | End: 2020-11-21

## 2020-11-18 RX ORDER — HYDROMORPHONE HYDROCHLORIDE 2 MG/ML
0.5 INJECTION INTRAMUSCULAR; INTRAVENOUS; SUBCUTANEOUS
Refills: 0 | Status: DISCONTINUED | OUTPATIENT
Start: 2020-11-18 | End: 2020-11-19

## 2020-11-18 RX ORDER — GLUCAGON INJECTION, SOLUTION 0.5 MG/.1ML
1 INJECTION, SOLUTION SUBCUTANEOUS ONCE
Refills: 0 | Status: DISCONTINUED | OUTPATIENT
Start: 2020-11-18 | End: 2020-11-21

## 2020-11-18 RX ORDER — CEFAZOLIN SODIUM 1 G
2000 VIAL (EA) INJECTION EVERY 8 HOURS
Refills: 0 | Status: DISCONTINUED | OUTPATIENT
Start: 2020-11-18 | End: 2020-11-18

## 2020-11-18 RX ORDER — LANOLIN ALCOHOL/MO/W.PET/CERES
3 CREAM (GRAM) TOPICAL AT BEDTIME
Refills: 0 | Status: DISCONTINUED | OUTPATIENT
Start: 2020-11-18 | End: 2020-11-18

## 2020-11-18 RX ORDER — INSULIN LISPRO 100/ML
3 VIAL (ML) SUBCUTANEOUS ONCE
Refills: 0 | Status: COMPLETED | OUTPATIENT
Start: 2020-11-18 | End: 2020-11-18

## 2020-11-18 RX ORDER — CEFAZOLIN SODIUM 1 G
2000 VIAL (EA) INJECTION EVERY 8 HOURS
Refills: 0 | Status: COMPLETED | OUTPATIENT
Start: 2020-11-18 | End: 2020-11-19

## 2020-11-18 RX ORDER — INSULIN LISPRO 100/ML
VIAL (ML) SUBCUTANEOUS AT BEDTIME
Refills: 0 | Status: DISCONTINUED | OUTPATIENT
Start: 2020-11-18 | End: 2020-11-21

## 2020-11-18 RX ORDER — INSULIN LISPRO 100/ML
VIAL (ML) SUBCUTANEOUS
Refills: 0 | Status: DISCONTINUED | OUTPATIENT
Start: 2020-11-18 | End: 2020-11-21

## 2020-11-18 RX ORDER — SODIUM CHLORIDE 9 MG/ML
1000 INJECTION, SOLUTION INTRAVENOUS
Refills: 0 | Status: DISCONTINUED | OUTPATIENT
Start: 2020-11-18 | End: 2020-11-18

## 2020-11-18 RX ORDER — INSULIN LISPRO 100/ML
VIAL (ML) SUBCUTANEOUS AT BEDTIME
Refills: 0 | Status: DISCONTINUED | OUTPATIENT
Start: 2020-11-18 | End: 2020-11-18

## 2020-11-18 RX ORDER — HYDROMORPHONE HYDROCHLORIDE 2 MG/ML
30 INJECTION INTRAMUSCULAR; INTRAVENOUS; SUBCUTANEOUS
Refills: 0 | Status: DISCONTINUED | OUTPATIENT
Start: 2020-11-18 | End: 2020-11-18

## 2020-11-18 RX ORDER — GLUCAGON INJECTION, SOLUTION 0.5 MG/.1ML
1 INJECTION, SOLUTION SUBCUTANEOUS ONCE
Refills: 0 | Status: DISCONTINUED | OUTPATIENT
Start: 2020-11-18 | End: 2020-11-18

## 2020-11-18 RX ORDER — DEXTROSE 50 % IN WATER 50 %
15 SYRINGE (ML) INTRAVENOUS ONCE
Refills: 0 | Status: DISCONTINUED | OUTPATIENT
Start: 2020-11-18 | End: 2020-11-21

## 2020-11-18 RX ORDER — DEXTROSE 50 % IN WATER 50 %
15 SYRINGE (ML) INTRAVENOUS ONCE
Refills: 0 | Status: DISCONTINUED | OUTPATIENT
Start: 2020-11-18 | End: 2020-11-18

## 2020-11-18 RX ORDER — HYDROMORPHONE HYDROCHLORIDE 2 MG/ML
30 INJECTION INTRAMUSCULAR; INTRAVENOUS; SUBCUTANEOUS
Refills: 0 | Status: DISCONTINUED | OUTPATIENT
Start: 2020-11-18 | End: 2020-11-19

## 2020-11-18 RX ORDER — ONDANSETRON 8 MG/1
4 TABLET, FILM COATED ORAL ONCE
Refills: 0 | Status: COMPLETED | OUTPATIENT
Start: 2020-11-18 | End: 2020-11-20

## 2020-11-18 RX ORDER — LANOLIN ALCOHOL/MO/W.PET/CERES
3 CREAM (GRAM) TOPICAL AT BEDTIME
Refills: 0 | Status: DISCONTINUED | OUTPATIENT
Start: 2020-11-18 | End: 2020-11-21

## 2020-11-18 RX ADMIN — SODIUM CHLORIDE 75 MILLILITER(S): 9 INJECTION, SOLUTION INTRAVENOUS at 18:34

## 2020-11-18 RX ADMIN — HYDROMORPHONE HYDROCHLORIDE 0.5 MILLIGRAM(S): 2 INJECTION INTRAMUSCULAR; INTRAVENOUS; SUBCUTANEOUS at 17:15

## 2020-11-18 RX ADMIN — HYDROMORPHONE HYDROCHLORIDE 0.5 MILLIGRAM(S): 2 INJECTION INTRAMUSCULAR; INTRAVENOUS; SUBCUTANEOUS at 16:50

## 2020-11-18 RX ADMIN — CHLORHEXIDINE GLUCONATE 1 APPLICATION(S): 213 SOLUTION TOPICAL at 09:45

## 2020-11-18 RX ADMIN — HYDROMORPHONE HYDROCHLORIDE 0.5 MILLIGRAM(S): 2 INJECTION INTRAMUSCULAR; INTRAVENOUS; SUBCUTANEOUS at 17:00

## 2020-11-18 RX ADMIN — Medication 3 UNIT(S): at 10:09

## 2020-11-18 RX ADMIN — HYDROMORPHONE HYDROCHLORIDE 0.5 MILLIGRAM(S): 2 INJECTION INTRAMUSCULAR; INTRAVENOUS; SUBCUTANEOUS at 17:24

## 2020-11-18 RX ADMIN — Medication 100 MILLIGRAM(S): at 23:20

## 2020-11-18 RX ADMIN — HYDROMORPHONE HYDROCHLORIDE 30 MILLILITER(S): 2 INJECTION INTRAMUSCULAR; INTRAVENOUS; SUBCUTANEOUS at 17:33

## 2020-11-18 RX ADMIN — HYDROMORPHONE HYDROCHLORIDE 0.5 MILLIGRAM(S): 2 INJECTION INTRAMUSCULAR; INTRAVENOUS; SUBCUTANEOUS at 17:05

## 2020-11-18 RX ADMIN — Medication 2: at 18:30

## 2020-11-18 RX ADMIN — HYDROMORPHONE HYDROCHLORIDE 30 MILLILITER(S): 2 INJECTION INTRAMUSCULAR; INTRAVENOUS; SUBCUTANEOUS at 20:07

## 2020-11-18 RX ADMIN — HYDROMORPHONE HYDROCHLORIDE 0.5 MILLIGRAM(S): 2 INJECTION INTRAMUSCULAR; INTRAVENOUS; SUBCUTANEOUS at 17:25

## 2020-11-18 RX ADMIN — Medication 2: at 22:23

## 2020-11-18 RX ADMIN — HYDROMORPHONE HYDROCHLORIDE 0.5 MILLIGRAM(S): 2 INJECTION INTRAMUSCULAR; INTRAVENOUS; SUBCUTANEOUS at 17:40

## 2020-11-18 RX ADMIN — HYDROMORPHONE HYDROCHLORIDE 30 MILLILITER(S): 2 INJECTION INTRAMUSCULAR; INTRAVENOUS; SUBCUTANEOUS at 20:53

## 2020-11-18 RX ADMIN — HYDROMORPHONE HYDROCHLORIDE 0.5 MILLIGRAM(S): 2 INJECTION INTRAMUSCULAR; INTRAVENOUS; SUBCUTANEOUS at 17:10

## 2020-11-18 RX ADMIN — SODIUM CHLORIDE 3 MILLILITER(S): 9 INJECTION INTRAMUSCULAR; INTRAVENOUS; SUBCUTANEOUS at 09:45

## 2020-11-18 NOTE — ASU PREOP CHECKLIST - STERILIZATION AFFIRMATION
[facility]  CONSULTATION    Patient: Piotr Menchaca Date of Service: 5/7/2018   YOB: 1954 Admission Date: 5/5/2018   MRN: [id number] Attending: Kurt Ludwig MD   PCP: No Pcp   Hospital Day: Hospital Day: 3     REQUESTING PHYSICIAN: Kurt Ludwig MD  REASON FOR CONSULT:  Acute bilateral PE  PRIMARY CARDIOLOGIST: None    CHIEF COMPLAINT:  Chief Complaint   Patient presents with   • Medical Problem       HISTORY OF PRESENT ILLNESS:   Piotr Menchaca is a 63 year old male with history of polysubstance abuse (EtOH, drug, tobacco) and pulmonary embolism (remote use of Coumadin) who was admitted for bilateral pulmonary emboli.  A 2D TTE revealed right heart strain but no BNP or troponin was drawn.  The patient was started on heparin and transitioned to Xarelto.    Cardiology was consulted to establish care and arrange for follow up.  On room entry the patient was resting comfortably in bed watching television.  He was unaware as to why cardiology was consulted.  He denied having fever/chills, nausea, vomiting, dizziness, vision changes, chest pressure, palpitations, orthopnea, PND, abdominal pain, or lower limb edema.  Also denied hemoptysis, hematemesis, melena, or hematochezia.    CURRENT AND PREVIOUS CARDIAC STUDIES:     EKG:   Date: 5/7/18      ECHO:   Date: 5/7/18  At least moderate PHT .  Upper normal RV with low normal RVEF .  2/4 TR  Moderate PI . Trivial pericardial effusion .  No prior TTE for a comparison .    STRESS TEST:  None    PRIOR CARDIAC CATH AND INTERVENTIONS:   None    REVIEW OF SYSTEMS:   Gen: denies weight change, fever, chills   Eyes: denies vision changes  ENT: denies tinnitus, epistaxis, dysphagia  Pulm: denies cough, hemoptysis, wheezing   CV: as per HPI   GI: denies nausea or vomiting, bloody stools   : denies dysuria, hematuria, urinary frequency   MSK: denies joint swelling, joint stiffness, muscle weakness   Skin: denies jaundice, rashes   Neuro:  denies HA, focal weakness, numbness     PAST HISTORY:     PAST MEDICAL HISTORY:   Past Medical History:   Diagnosis Date   • Blood clot associated with vein wall inflammation    • Fracture    • Negative History of CA, HTN, DM, CAD, CVA, DVT, Asthma    • Pneumonia      Patient Active Problem List    Diagnosis Date Noted   • Fx left index metacarpal head 12/23/2011     Priority: Low       HOME MEDICATIONS:    No current facility-administered medications on file prior to encounter.   Current Outpatient Prescriptions on File Prior to Encounter:  moxifloxacin (VIGAMOX) 0.5 % ophthalmic solution   HYDROcodone-acetaminophen (NORCO) 5-325 MG per tablet   HYDROcodone-acetaminophen (NORCO) 5-325 MG per tablet       ALLERGIES  ALLERGIES:  No Known Allergies    FAMILY HISTORY:  family history includes Heart disease in his brother; NEGATIVE FAMILY HX OF in an other family member; Patient is unaware of any medical problems in his brother, brother, brother, brother, mother, sister, sister, and son.    SOCIAL HISTORY:   reports that he has been smoking Cigarettes.  He has a 20.00 pack-year smoking history. He has never used smokeless tobacco. He reports that he drinks about 1.2 oz of alcohol per week . He reports that he uses drugs, including Cannabinols and Cocaine.  Lives alone in apartment.  Unemployed.  Lifestyle--'hustles' for a living    PHYSICAL EXAM:   Blood pressure 118/81, pulse 86, temperature 97.7 °F (36.5 °C), temperature source Oral, resp. rate 18, height 5' 11\" (1.803 m), weight 67.4 kg, SpO2 95 %.    Intake/Output Summary (Last 24 hours) at 05/07/18 1035  Last data filed at 05/06/18 1809   Gross per 24 hour   Intake              840 ml   Output              700 ml   Net              140 ml     PHYSICAL EXAM:  General:  Awake, alert and oriented and in no acute distress  Eyes: No xanthelasma, EOMI (extraocular movements intact), anicteric sclerae  ENT/Mouth: No ear discharge. Mucous membranes moist.  Neck:  Spontaneous full range of motion, no thyromegaly, trachea midline.  Cardiovascular system:  Prominent apical impulse, RRR, S1S2 normal, S2 split, grade 2 holosystolic murmur at LLSB  Respiratory: Bilateral air entry, clear bilaterally  Abdomen: Soft, nontender. Pulsating aorta.  Extremities:  No peripheral edema no digital cyanosis no clubbing   Psychiatric: Alert, oriented to time, place, and person. Mood and affect appropriate.  Lymphatic: No cervical or supraclavicular lymphadenopathy.  Skin: Warm and dry, no rashes visualized  Neurological: CN (cranial nerves) II-XII grossly intact. Bilateral sensory and motor function normal.  Musculoskeletal: No joint pain and no visual signs of joint inflammation.     CURRENT MEDICATIONS:   Scheduled:  • acetaminophen  650 mg Oral Q6H   • sodium chloride (PF)  2 mL Injection 2 times per day   • vitamin - therapeutic multivitamins w/minerals  1 tablet Oral Daily   • rivaroxaban  15 mg Oral BID WC       Infusions:      PRN:  sodium chloride (PF), sodium chloride (PF), potassium chloride, potassium chloride, potassium chloride, potassium chloride, sodium chloride, potassium phosphate/sodium phosphate, magnesium sulfate, magnesium sulfate, magnesium sulfate, traMADol, ketorolac  LABS:     CARDIAC MARKERS: No results found    BNP: No results found    CBC:   Recent Labs  Lab 05/07/18  0402 05/06/18  0450 05/05/18  0740   WBC 9.5 11.7* 5.5   HGB 15.3 16.4 15.7   HCT 43.5 46.2 44.5    186 186       CMP:  Recent Labs  Lab 05/07/18  0402 05/06/18  1631 05/06/18  0450 05/05/18  0740   SODIUM 139  --  136 139   POTASSIUM 4.0 4.0 3.9 4.0   CHLORIDE 103  --  102 103   CO2 28  --  26 26   BUN 15  --  13 14   CREATININE 0.96  --  1.00 1.01   GLUCOSE 94  --  96 85   ALBUMIN  --   --   --  3.2*   GPT  --   --   --  17   ALKPT  --   --   --  114   BILIRUBIN  --   --   --  0.8       LIPID PANEL: No results found    HbA1c: No results found for: HGBA1C    COAGULATION STUDIES:   Recent  Labs  Lab 05/05/18  0740   PTT 40*       BEST PRACTICE BOX     AMI WITH Heart Failure with Reduced LVEF (<40%)?    no  AMI?  No  Heart Failure with Reduced LVEF (< 40%)?  No    ASSESSMENT, PROBLEM LIST, AND PLAN:   Piotr Menchaca is a 63 year old male with history of polysubstance abuse (Crack, Marijuana, EtOH) who was admitted for bilateral pulmonary emboli.  Cardiology was consulted to establish care as the patient is slated for discharge.     # Bilateral pulmonary emboli with RV strain: RV function is mildly dilated and dysfunctional.  Started on Xarelto.   Recommend F/U with cardiologist at [facility] for monitoring RV function, pulmonary hypertension.    # Pulsating abdominal aorta:  History of prolonged tobacco use, recommend U/S abdomen as outpatient if discharge planned today.  # Trivial pericardial effusion: Asymptomatic.      Patient seen with Dr. Sotelo, who formulated the plan.    Eliseo Mendez MD   Cardiology Fellow PGY4  5/7/2018 10:35 AM  Pager [phone]    COVERAGE (AFTER 5 PM ON WEEKDAYS AND ON WEEKENDS)  Monday-Thursday (5 pm to 7am): Please page on call cardiology fellow at [phone].  If patient had a cardiac procedure today, page on call cath fellow for procedure related issues from 5 pm to 7am.  I have interviewed and examined the patient and agree with the findings, assessment and plan as outlined by the cardiology fellow.    Patient denies dyspnea  Lungs are clear  Regular rhythm  No RV gallop or JVD  Richard Sotelo MD     Debridement Text: The wound edges were debrided prior to proceeding with the closure to facilitate wound healing. n/a

## 2020-11-18 NOTE — CHART NOTE - NSCHARTNOTEFT_GEN_A_CORE
Patient seen in PACU resting with complaint of LUE pain.  No Chest Pain, SOB, N/V.    T(C): 36 (11-18-20 @ 16:45), Max: 36.8 (11-18-20 @ 09:31)  HR: 71 (11-18-20 @ 18:00) (64 - 89)  BP: 106/74 (11-18-20 @ 18:00) (106/74 - 117/76)  RR: 14 (11-18-20 @ 18:00) (14 - 17)  SpO2: 100% (11-18-20 @ 18:00) (91% - 100%)  Wt(kg): --    Exam:  Alert and oriented, no acute distress  Laterality: LUE dressing in place, C/D/I with HV maintaining good suction  Moving all digits  SILT AIN/PIN/M/U  + Radial pulse    Xray: in chart             A/P: 50yFemale S/p  L Elbow Open Contracture Release. VSS. NAD  -PT/OT-WBAT LUE in sling  -IS encouraged  -DVT PPx with SCDs  -Followup AM labs  -Pain Control with PCA  -PACU to floor    Nathaly Gunderson PA-C  Team Pager #[phone]

## 2020-11-18 NOTE — PRE-ANESTHESIA EVALUATION ADULT - NSANTHPMHFT_GEN_ALL_CORE
chart reviewed. poorly controlled dm a1c ~9, fs ~200-250. fs 265 treated in sda w/ 3u insulin. pt advised to follow up with pmd for tighter glycemic control. no hx cv/pulm dz - states good prior et no cp/sob

## 2020-11-19 ENCOUNTER — TRANSCRIPTION ENCOUNTER (OUTPATIENT)
Age: 50
End: 2020-11-19

## 2020-11-19 LAB
GLUCOSE BLDC GLUCOMTR-MCNC: 271 MG/DL — HIGH (ref 70–99)
GLUCOSE BLDC GLUCOMTR-MCNC: 351 MG/DL — HIGH (ref 70–99)
GLUCOSE BLDC GLUCOMTR-MCNC: 352 MG/DL — HIGH (ref 70–99)
GLUCOSE BLDC GLUCOMTR-MCNC: 378 MG/DL — HIGH (ref 70–99)

## 2020-11-19 RX ORDER — HYDROMORPHONE HYDROCHLORIDE 2 MG/ML
0.5 INJECTION INTRAMUSCULAR; INTRAVENOUS; SUBCUTANEOUS ONCE
Refills: 0 | Status: DISCONTINUED | OUTPATIENT
Start: 2020-11-19 | End: 2020-11-19

## 2020-11-19 RX ORDER — OXYCODONE HYDROCHLORIDE 5 MG/1
5 TABLET ORAL EVERY 4 HOURS
Refills: 0 | Status: DISCONTINUED | OUTPATIENT
Start: 2020-11-19 | End: 2020-11-21

## 2020-11-19 RX ORDER — ACETAMINOPHEN 500 MG
1000 TABLET ORAL ONCE
Refills: 0 | Status: COMPLETED | OUTPATIENT
Start: 2020-11-19 | End: 2020-11-20

## 2020-11-19 RX ORDER — OXYCODONE HYDROCHLORIDE 5 MG/1
7.5 TABLET ORAL EVERY 4 HOURS
Refills: 0 | Status: DISCONTINUED | OUTPATIENT
Start: 2020-11-19 | End: 2020-11-20

## 2020-11-19 RX ADMIN — Medication 5: at 08:50

## 2020-11-19 RX ADMIN — Medication 100 MILLIGRAM(S): at 06:58

## 2020-11-19 RX ADMIN — OXYCODONE HYDROCHLORIDE 5 MILLIGRAM(S): 5 TABLET ORAL at 18:57

## 2020-11-19 RX ADMIN — HYDROMORPHONE HYDROCHLORIDE 0.5 MILLIGRAM(S): 2 INJECTION INTRAMUSCULAR; INTRAVENOUS; SUBCUTANEOUS at 16:18

## 2020-11-19 RX ADMIN — OXYCODONE HYDROCHLORIDE 5 MILLIGRAM(S): 5 TABLET ORAL at 18:07

## 2020-11-19 RX ADMIN — HYDROMORPHONE HYDROCHLORIDE 30 MILLILITER(S): 2 INJECTION INTRAMUSCULAR; INTRAVENOUS; SUBCUTANEOUS at 07:40

## 2020-11-19 RX ADMIN — Medication 3: at 21:39

## 2020-11-19 RX ADMIN — OXYCODONE HYDROCHLORIDE 5 MILLIGRAM(S): 5 TABLET ORAL at 11:49

## 2020-11-19 RX ADMIN — OXYCODONE HYDROCHLORIDE 5 MILLIGRAM(S): 5 TABLET ORAL at 12:30

## 2020-11-19 RX ADMIN — OXYCODONE HYDROCHLORIDE 7.5 MILLIGRAM(S): 5 TABLET ORAL at 22:41

## 2020-11-19 RX ADMIN — Medication 3: at 18:00

## 2020-11-19 RX ADMIN — HYDROMORPHONE HYDROCHLORIDE 0.5 MILLIGRAM(S): 2 INJECTION INTRAMUSCULAR; INTRAVENOUS; SUBCUTANEOUS at 16:30

## 2020-11-19 RX ADMIN — OXYCODONE HYDROCHLORIDE 7.5 MILLIGRAM(S): 5 TABLET ORAL at 23:20

## 2020-11-19 RX ADMIN — Medication 5: at 13:45

## 2020-11-19 NOTE — DISCHARGE NOTE PROVIDER - NSDCMRMEDTOKEN_GEN_ALL_CORE_FT
metFORMIN 500 mg oral tablet: 1 tab(s) orally 2 times a day  naproxen 250 mg oral tablet: 1 tab(s) orally 2 times a day, As Needed   metFORMIN 1000 mg oral tablet: 1 tab(s) orally 2 times a day MDD:2  naproxen 250 mg oral tablet: 1 tab(s) orally 2 times a day, As Needed  oxyCODONE 5 mg oral tablet: 1 tab(s) orally every 4 hours, As needed, Moderate Pain (4 - 6) MDD:6  Trulicity Pen 0.75 mg/0.5 mL subcutaneous solution: 0.75 milligram(s) subcutaneously once a week

## 2020-11-19 NOTE — OCCUPATIONAL THERAPY INITIAL EVALUATION ADULT - LIVES WITH, PROFILE
Pt lives with sister in private home, 0 steps to enter, walk in shower. Pt I in ADLs and ambulation prior to admission

## 2020-11-19 NOTE — PHYSICAL THERAPY INITIAL EVALUATION ADULT - PERTINENT HX OF CURRENT PROBLEM, REHAB EVAL
49y/o F p/w a chief complaint of Left elbow contracture, pt now s/p release of left elbow contracture, splinted in extension

## 2020-11-19 NOTE — PHYSICAL THERAPY INITIAL EVALUATION ADULT - ADDITIONAL COMMENTS
Pt lives with sister in private home, no steps to enter. Previously independent with all functional mobility.

## 2020-11-19 NOTE — DISCHARGE NOTE PROVIDER - CARE PROVIDER_API CALL
Dominick Schmidt)  Orthopaedic Surgery  [address] [place] [address], [address]  [place] [address]  Phone: [phone]  Fax: [phone]  Follow Up Time:    Dominick Schmidt (MD)  Orthopaedic Surgery  [address] [place] [address], [address]  [place] [address]  Phone: [phone]  Fax: [phone]  Follow Up Time:     Avery Villanueva  ENDOCRINOLOGY/METAB/DIABETES  [address] [place] [address]  Phone: [phone]  Fax: [phone]  Follow Up Time:

## 2020-11-19 NOTE — PROGRESS NOTE ADULT - SUBJECTIVE AND OBJECTIVE BOX
Day 1 of Anesthesia Pain Management Service    SUBJECTIVE: I'm doing ok    Pain Scale Score:	[X] Refer to charted pain scores    THERAPY:    [ ] IV PCA Morphine		[ ] 5 mg/mL	[ ] 1 mg/mL  [X] IV PCA Hydromorphone	[ ] 5 mg/mL	[X] 1 mg/mL  [ ] IV PCA Fentanyl		[ ] 50 micrograms/mL    Demand dose: 0.2 mg     Lockout: 6 minutes   Continuous Rate: 0 mg/hr  4 Hour Limit: 4 mg    MEDICATIONS  (STANDING):  dextrose 40% Gel 15 Gram(s) Oral once  dextrose 5%. 1000 milliLiter(s) (50 mL/Hr) IV Continuous <Continuous>  dextrose 5%. 1000 milliLiter(s) (100 mL/Hr) IV Continuous <Continuous>  dextrose 50% Injectable 25 Gram(s) IV Push once  dextrose 50% Injectable 25 Gram(s) IV Push once  glucagon  Injectable 1 milliGRAM(s) IntraMuscular once  HYDROmorphone PCA (1 mG/mL) 30 milliLiter(s) PCA Continuous PCA Continuous  insulin lispro (ADMELOG) corrective regimen sliding scale   SubCutaneous at bedtime  insulin lispro (ADMELOG) corrective regimen sliding scale   SubCutaneous three times a day before meals  lactated ringers. 1000 milliLiter(s) (75 mL/Hr) IV Continuous <Continuous>    MEDICATIONS  (PRN):  HYDROmorphone PCA (1 mG/mL) Rescue Clinician Bolus 0.5 milliGRAM(s) IV Push every 15 minutes PRN for Pain Scale GREATER THAN 6  melatonin 3 milliGRAM(s) Oral at bedtime PRN Insomnia  naloxone Injectable 0.1 milliGRAM(s) IV Push every 3 minutes PRN For ANY of the following changes in patient status:  A. RR LESS THAN 10 breaths per minute, B. Oxygen saturation LESS THAN 90%, C. Sedation score of 6  ondansetron Injectable 4 milliGRAM(s) IV Push once PRN Nausea and/or Vomiting      OBJECTIVE:    Sedation Score:	[ X] Alert 	[ ] Drowsy 	[ ] Arousable	[ ] Asleep	[ ] Unresponsive    Side Effects:	[X ] None	[ ] Nausea	[ ] Vomiting	[ ] Pruritus  		[ ] Other:    Vital Signs Last 24 Hrs  T(C): 36.5 (19 Nov 2020 09:21), Max: 37 (19 Nov 2020 01:30)  T(F): 97.7 (19 Nov 2020 09:21), Max: 98.6 (19 Nov 2020 01:30)  HR: 82 (19 Nov 2020 09:21) (64 - 89)  BP: 110/72 (19 Nov 2020 09:21) (103/71 - 134/78)  BP(mean): 106 (18 Nov 2020 20:00) (75 - 106)  RR: 16 (19 Nov 2020 09:21) (11 - 19)  SpO2: 94% (19 Nov 2020 09:21) (91% - 100%)    ASSESSMENT/ PLAN    Therapy to  be:               [X] Continued   [ ] Discontinued   [ ] Changed to PRN Analgesics    Documentation and Verification of current medications:   [X] Done	[ ] Not done, not eligible    Comments: Endorsing good analgesia with PCA

## 2020-11-19 NOTE — PROGRESS NOTE ADULT - ASSESSMENT
49 y/o FM s/p left elbow contracture release POD#1, PT/OT, d/c planning  Alondra Corley PA-C  Orthopaedic Surgery  Team pager [phone]  [facility] [phone]  [phone]

## 2020-11-19 NOTE — DISCHARGE NOTE PROVIDER - HOSPITAL COURSE
Reason for Admission  "I'm having left elbow surgery"     History of Present Illness:  History of Present Illness    51 y/o female with h/o T2DM c/o left elbow fracture in 2018 s/p radial head repair in 2018 and contracture release in 2019, s/p manipulation of left elbow under anesthesia 2019. Pt states she continues to have pain and is unable to fully extend her left elbow. Today she presents to [facility] for scheduled Revision of Left Elbow Contraction Release on 20. Denies any palpitations, SOB, N/V, fever or chills.      Past Medical, Past Surgical History:  PAST MEDICAL HISTORY:  Contracture, left elbow   Displaced fracture of head of left radius fracture dislocation of left elbow and avulsion fracture of the coronoid and triceps rupture 18  Low back pain   T2DM (type 2 diabetes mellitus).     PAST SURGICAL HISTORY:  H/O elbow surgery 2019 manipulation of left elbow under anesthesia  H/O hand surgery left hand 5th finger fracture repair  Radial head fracture Radial head replacement, repair of coronoid fracture, repair of lateral ligaments, and repair of triceps rupture of the left elbow on 18.  S/P    S/P decompression of ulnar nerve at elbow 2019; contracture release.     HOSPITAL COURSE:  51 y/o FM underwent left elbow contracture release on 2020 with Dr. Schmidt.  Patient tolerated procedure well.  Patient was evaluated postoperatively by physical and occupational therapists for weight bearing as tolerated left U/E in extension splint  and cleared patient for discharge home.  Patient advised to keep surgical incision/dressing clean and dry, and  follow up with Dr. Schmidt in his office post operative day #14 (2020).   Reason for Admission  "I'm having left elbow surgery"     History of Present Illness:  History of Present Illness    51 y/o female with h/o T2DM c/o left elbow fracture in 2018 s/p radial head repair in 2018 and contracture release in 2019, s/p manipulation of left elbow under anesthesia 2019. Pt states she continues to have pain and is unable to fully extend her left elbow. Today she presents to [facility] for scheduled Revision of Left Elbow Contraction Release on 20. Denies any palpitations, SOB, N/V, fever or chills.      Past Medical, Past Surgical History:  PAST MEDICAL HISTORY:  Contracture, left elbow   Displaced fracture of head of left radius fracture dislocation of left elbow and avulsion fracture of the coronoid and triceps rupture 18  Low back pain   T2DM (type 2 diabetes mellitus).     PAST SURGICAL HISTORY:  H/O elbow surgery 2019 manipulation of left elbow under anesthesia  H/O hand surgery left hand 5th finger fracture repair  Radial head fracture Radial head replacement, repair of coronoid fracture, repair of lateral ligaments, and repair of triceps rupture of the left elbow on 18.  S/P    S/P decompression of ulnar nerve at elbow 2019; contracture release.     HOSPITAL COURSE:  51 y/o FM underwent left elbow contracture release on 2020 with Dr. Schmidt.  Patient tolerated procedure well.  Patient was evaluated postoperatively by physical and occupational therapists for weight bearing as tolerated left U/E in extension splint  and cleared patient for discharge home. Patient was found to be hyperglycemic throughout admission and house endocrine was consulted and recommendations followed. Patient to follow up as outpatient at [address] [place] [address].  Patient advised to keep surgical incision/dressing clean and dry, and  follow up with Dr. Schmidt in his office post operative day #14 (2020).

## 2020-11-19 NOTE — DISCHARGE NOTE PROVIDER - NSDCACTIVITY_GEN_ALL_CORE
Do not make important decisions/Walking - Indoors allowed/No heavy lifting/straining/Do not drive or operate machinery/Walking - Outdoors allowed

## 2020-11-19 NOTE — DISCHARGE NOTE PROVIDER - NSDCFUADDINST_GEN_ALL_CORE_FT
Keep splint/dressing on left upper extremity clean and dry, maintain weight bearing as tolerated, LUE. Follow up with Dr. Schmidt in his office post operative day #14 (12/2/2020) for wound check and suture removal. Keep splint/dressing on left upper extremity clean and dry, maintain weight bearing as tolerated, LUE. Follow up with Dr. Schmidt in his office post operative day #14 (12/2/2020) for wound check and suture removal. Follow up with Dr Villanueva, [address] [place] [address] [phone] as outpatient in 3-4 weeks

## 2020-11-19 NOTE — OCCUPATIONAL THERAPY INITIAL EVALUATION ADULT - PERTINENT HX OF CURRENT PROBLEM, REHAB EVAL
50y old  Female who presents with a chief complaint of Left elbow contracture, pt now s/p release of left elbow contracture, splinted in extension

## 2020-11-19 NOTE — PROGRESS NOTE ADULT - SUBJECTIVE AND OBJECTIVE BOX
Patient is a 50y old  Female who presents with a chief complaint of Left elbow contracture  Patient s/p release of left elbow contracture POD#1  Patient resting without complaints.  No chest pain, SOB, N/V.    T(C): 37 (11-19-20 @ 01:30), Max: 37 (11-19-20 @ 01:30)  HR: 80 (11-19-20 @ 01:30) (64 - 89)  BP: 121/70 (11-19-20 @ 01:30) (103/71 - 134/78)  RR: 16 (11-19-20 @ 01:30) (11 - 19)  SpO2: 94% (11-19-20 @ 01:30) (91% - 100%)    Exam:  Alert and Oriented, No Acute Distress  Cards: +S1/S2, RRR  Pulm: CTAB  Left upper extremity N/V intact, moving digits, sensation intact  Lower Extremities:  Calves Soft, Non-tender bilaterally  +PF/DF/EHL/FHL  SILT  +DP Pulse

## 2020-11-19 NOTE — DISCHARGE NOTE PROVIDER - PROVIDER TOKENS
PROVIDER:[TOKEN:[[id number]]] PROVIDER:[TOKEN:[[id number]]],PROVIDER:[TOKEN:[[id number]]]

## 2020-11-19 NOTE — DISCHARGE NOTE PROVIDER - CARE PROVIDERS DIRECT ADDRESSES
,DirectAddress_Unknown ,DirectAddress_Unknown,[email or web address]

## 2020-11-19 NOTE — PHYSICAL THERAPY INITIAL EVALUATION ADULT - ACTIVE RANGE OF MOTION EXAMINATION, REHAB EVAL
Right UE Active ROM was WFL (within functional limits)/bilateral  lower extremity Active ROM was WFL (within functional limits)/L wrist WFL; L shoulder/elbow not tested secondary to placement of splint;

## 2020-11-19 NOTE — PHYSICAL THERAPY INITIAL EVALUATION ADULT - MANUAL MUSCLE TESTING RESULTS, REHAB EVAL
strength grossly at least 3/5 throughout with exception of  L wrist WFL; L elbow not tested secondary to placement of splint;

## 2020-11-20 DIAGNOSIS — E11.65 TYPE 2 DIABETES MELLITUS WITH HYPERGLYCEMIA: ICD-10-CM

## 2020-11-20 LAB
GLUCOSE BLDC GLUCOMTR-MCNC: 247 MG/DL — HIGH (ref 70–99)
GLUCOSE BLDC GLUCOMTR-MCNC: 256 MG/DL — HIGH (ref 70–99)
GLUCOSE BLDC GLUCOMTR-MCNC: 261 MG/DL — HIGH (ref 70–99)
GLUCOSE BLDC GLUCOMTR-MCNC: 292 MG/DL — HIGH (ref 70–99)
GLUCOSE BLDC GLUCOMTR-MCNC: 318 MG/DL — HIGH (ref 70–99)
GLUCOSE BLDC GLUCOMTR-MCNC: 338 MG/DL — HIGH (ref 70–99)

## 2020-11-20 PROCEDURE — 99254 IP/OBS CNSLTJ NEW/EST MOD 60: CPT

## 2020-11-20 RX ORDER — DULAGLUTIDE 4.5 MG/.5ML
0.75 INJECTION, SOLUTION SUBCUTANEOUS
Qty: 3.21 | Refills: 0
Start: 2020-11-20 | End: 2020-12-19

## 2020-11-20 RX ORDER — INSULIN GLARGINE 100 [IU]/ML
12 INJECTION, SOLUTION SUBCUTANEOUS AT BEDTIME
Refills: 0 | Status: DISCONTINUED | OUTPATIENT
Start: 2020-11-20 | End: 2020-11-21

## 2020-11-20 RX ORDER — SEMAGLUTIDE 0.68 MG/ML
0.25 INJECTION, SOLUTION SUBCUTANEOUS
Qty: 1.07 | Refills: 0
Start: 2020-11-20 | End: 2020-12-19

## 2020-11-20 RX ORDER — LIRAGLUTIDE 6 MG/ML
0.6 INJECTION SUBCUTANEOUS
Qty: 18 | Refills: 0
Start: 2020-11-20 | End: 2020-12-19

## 2020-11-20 RX ORDER — INSULIN LISPRO 100/ML
4 VIAL (ML) SUBCUTANEOUS
Refills: 0 | Status: DISCONTINUED | OUTPATIENT
Start: 2020-11-20 | End: 2020-11-21

## 2020-11-20 RX ADMIN — OXYCODONE HYDROCHLORIDE 5 MILLIGRAM(S): 5 TABLET ORAL at 21:41

## 2020-11-20 RX ADMIN — OXYCODONE HYDROCHLORIDE 5 MILLIGRAM(S): 5 TABLET ORAL at 10:16

## 2020-11-20 RX ADMIN — OXYCODONE HYDROCHLORIDE 7.5 MILLIGRAM(S): 5 TABLET ORAL at 05:56

## 2020-11-20 RX ADMIN — Medication 1000 MILLIGRAM(S): at 17:15

## 2020-11-20 RX ADMIN — OXYCODONE HYDROCHLORIDE 5 MILLIGRAM(S): 5 TABLET ORAL at 21:11

## 2020-11-20 RX ADMIN — Medication 2: at 22:04

## 2020-11-20 RX ADMIN — Medication 4 UNIT(S): at 13:09

## 2020-11-20 RX ADMIN — OXYCODONE HYDROCHLORIDE 7.5 MILLIGRAM(S): 5 TABLET ORAL at 06:45

## 2020-11-20 RX ADMIN — OXYCODONE HYDROCHLORIDE 5 MILLIGRAM(S): 5 TABLET ORAL at 11:00

## 2020-11-20 RX ADMIN — Medication 400 MILLIGRAM(S): at 16:36

## 2020-11-20 RX ADMIN — ONDANSETRON 4 MILLIGRAM(S): 8 TABLET, FILM COATED ORAL at 10:16

## 2020-11-20 RX ADMIN — Medication 3: at 10:37

## 2020-11-20 RX ADMIN — Medication 2: at 13:08

## 2020-11-20 RX ADMIN — Medication 3: at 19:28

## 2020-11-20 RX ADMIN — INSULIN GLARGINE 12 UNIT(S): 100 INJECTION, SOLUTION SUBCUTANEOUS at 22:04

## 2020-11-20 RX ADMIN — Medication 4 UNIT(S): at 19:29

## 2020-11-20 NOTE — CHART NOTE - NSCHARTNOTEFT_GEN_A_CORE
Ortho PA Note    HMV=15/45    As per Dr. Schmidt, patient visited for E V drain pull. Hemostasis achieved, patient tolerated well, tip intact. 4x4 Dsg/ ACE placed.        RICKEY Haywood  #[id number]

## 2020-11-20 NOTE — CONSULT NOTE ADULT - SUBJECTIVE AND OBJECTIVE BOX
HPI:  49 y/o F w/ hx of Type 2 DM dx abut 4 months ago without reported microvascular complications. Started on metformin now taking 1000mg BID with occasional missed doses. Checks glucose a few times a week with values on average 110-180 sometimes >200 without reported hypoglycemia or symptoms of hypoglycemia. Does not eat sweets. No soda or juice. Tries to limit carbs but does eat some tortillas and rice. +weight gain +polyuria and polydipsia. +recent nausea and vomiting earlier today.  Admitted with left radial fx.    PAST MEDICAL & SURGICAL HISTORY:  T2DM (type 2 diabetes mellitus)    Contracture, left elbow    Low back pain    Displaced fracture of head of left radius  fracture dislocation of left elbow and avulsion fracture of the coronoid and triceps rupture 18    H/O elbow surgery  2019 manipulation of left elbow under anesthesia    S/P decompression of ulnar nerve at elbow  2019; contracture release    S/P       Radial head fracture  Radial head replacement, repair of coronoid fracture, repair of lateral ligaments, and repair of triceps rupture of the left elbow on 18.    H/O hand surgery  left hand 5th finger fracture repair        FAMILY HISTORY:  Family history of stomach cancer (Father)        Social History: No tobacco use +alcohol use    Outpatient Medications:  Metformin 1000mg BID    MEDICATIONS  (STANDING):  acetaminophen  IVPB .. 1000 milliGRAM(s) IV Intermittent once  dextrose 40% Gel 15 Gram(s) Oral once  dextrose 5%. 1000 milliLiter(s) (50 mL/Hr) IV Continuous <Continuous>  dextrose 5%. 1000 milliLiter(s) (100 mL/Hr) IV Continuous <Continuous>  dextrose 50% Injectable 25 Gram(s) IV Push once  dextrose 50% Injectable 25 Gram(s) IV Push once  glucagon  Injectable 1 milliGRAM(s) IntraMuscular once  insulin glargine Injectable (LANTUS) 12 Unit(s) SubCutaneous at bedtime  insulin lispro (ADMELOG) corrective regimen sliding scale   SubCutaneous at bedtime  insulin lispro (ADMELOG) corrective regimen sliding scale   SubCutaneous three times a day before meals  insulin lispro Injectable (ADMELOG) 4 Unit(s) SubCutaneous three times a day before meals  lactated ringers. 1000 milliLiter(s) (75 mL/Hr) IV Continuous <Continuous>    MEDICATIONS  (PRN):  melatonin 3 milliGRAM(s) Oral at bedtime PRN Insomnia  oxyCODONE    IR 5 milliGRAM(s) Oral every 4 hours PRN Moderate Pain (4 - 6)  oxyCODONE    IR 7.5 milliGRAM(s) Oral every 4 hours PRN Severe Pain (7 - 10)      Allergies    No Known Allergies    Intolerances      Review of Systems:  Constitutional: No fever, +weight gain  Eyes: No blurry vision  Neuro: No headache, No paresthesias  HEENT: No throat pain  Cardiovascular: No chest pain  Respiratory: No SOB  GI: +nausea and vomiting  : +polyuria  Skin: no rash  Psych: no depression  Endocrine: + polydipsia, No heat or cold intolerance, rest as noted in HPI  Hem/lymph: no swelling    All other review of systems negative      PHYSICAL EXAM:  VITALS: T(C): 36.6 (20 @ 16:13)  T(F): 97.9 (20 @ 16:13), Max: 98.1 (20 @ 19:26)  HR: 66 (20 @ 16:13) (66 - 75)  BP: 118/75 (20 @ 16:13) (104/60 - 118/79)  RR:  (18 - 18)  SpO2:  (95% - 97%)  Wt(kg): --  GENERAL: NAD at this time  EYES: No proptosis, EOMI  HEENT:  Atraumatic, Normocephalic,   THYROID: Normal size, no palpable nodules  RESPIRATORY: Clear to auscultation bilaterally, full excursion, non-labored  CARDIOVASCULAR: Regular rhythm; No murmurs; no peripheral edema  GI: Soft, nontender, non distended, normal bowel sounds  SKIN: Dry, intact, No rashes or lesions +acanthosis  MUSCULOSKELETAL: decreased ROM of left UE with cast  NEURO: follows commands,   PSYCH: Alert and oriented x 3, normal affect, normal mood  CUSHING'S SIGNS: no striae      POCT Blood Glucose.: 247 mg/dL (20 @ 12:43)  POCT Blood Glucose.: 261 mg/dL (20 @ 10:26)  POCT Blood Glucose.: 256 mg/dL (20 @ 09:03)  POCT Blood Glucose.: 351 mg/dL (20 @ 21:31)  POCT Blood Glucose.: 271 mg/dL (20 @ 17:27)  POCT Blood Glucose.: 378 mg/dL (20 @ 13:24)  POCT Blood Glucose.: 352 mg/dL (20 @ 08:02)  POCT Blood Glucose.: 347 mg/dL (20 @ 22:07)  POCT Blood Glucose.: 201 mg/dL (20 @ 18:41)  POCT Blood Glucose.: 180 mg/dL (20 @ 12:53)  POCT Blood Glucose.: 220 mg/dL (20 @ 10:06)  POCT Blood Glucose.: 263 mg/dL (20 @ 09:01)                  Thyroid Function Tests:            Radiology:

## 2020-11-20 NOTE — PROGRESS NOTE ADULT - SUBJECTIVE AND OBJECTIVE BOX
Patient resting without complaints.  No chest pain, SOB, N/V.    T(C): 36.7 (11-20-20 @ 00:37), Max: 36.7 (11-19-20 @ 19:26)  HR: 68 (11-20-20 @ 00:37) (68 - 82)  BP: 104/60 (11-20-20 @ 00:37) (104/60 - 112/67)  RR: 18 (11-20-20 @ 00:37) (16 - 18)  SpO2: 95% (11-20-20 @ 00:37) (94% - 96%)  HMV=15/45    Exam:  Alert and Oriented, No Acute Distress  Extremities:  LUE: ACE dressing C/D/I, compartments soft, dull sensation grossly intact, Radial pulse 2+,  Fingers warm and mobile with brisk cap refill, +wrist ext/flexion  Calves Soft, Non-tender bilaterally

## 2020-11-20 NOTE — PROGRESS NOTE ADULT - ASSESSMENT
A/P: 51 y/o F POD# 2 s/p L elbow contracture release      LUE: WBAT/splint  Pain management prn  D/C HMV as per attending  Discharge to home        RICKEY Haywood  Orthopedic Surgery  [phone]

## 2020-11-20 NOTE — CONSULT NOTE ADULT - PROBLEM SELECTOR RECOMMENDATION 9
Diabetes Education and Nutrition Eval  While in-patient:  Start Lantus 12 units qhs  Start Admelog 4 units qac  Low correction scale qac + bedtime  Goal glucose 100-180  Outpt. endo follow-up  Outpt. optho, podiatry, micro/cr  Plan to d/c on metformin 1000mg BID with improved adherence and ideally a weekly GLP-1 such as Trulicity 0.75mg weekly or Ozempic 0.25mg weekly. If not covered can try Victoza daily. GLP-1 would be a good choice for additional glycemic control and weight loss benefits.   Can follow-up at [address] [place] [address]. [phone]

## 2020-11-21 ENCOUNTER — TRANSCRIPTION ENCOUNTER (OUTPATIENT)
Age: 50
End: 2020-11-21

## 2020-11-21 VITALS
TEMPERATURE: 99 F | OXYGEN SATURATION: 95 % | SYSTOLIC BLOOD PRESSURE: 122 MMHG | RESPIRATION RATE: 18 BRPM | HEART RATE: 64 BPM | DIASTOLIC BLOOD PRESSURE: 72 MMHG

## 2020-11-21 LAB — GLUCOSE BLDC GLUCOMTR-MCNC: 175 MG/DL — HIGH (ref 70–99)

## 2020-11-21 PROCEDURE — 97161 PT EVAL LOW COMPLEX 20 MIN: CPT

## 2020-11-21 PROCEDURE — 97165 OT EVAL LOW COMPLEX 30 MIN: CPT

## 2020-11-21 PROCEDURE — 97530 THERAPEUTIC ACTIVITIES: CPT

## 2020-11-21 PROCEDURE — 82962 GLUCOSE BLOOD TEST: CPT

## 2020-11-21 PROCEDURE — 76000 FLUOROSCOPY <1 HR PHYS/QHP: CPT

## 2020-11-21 PROCEDURE — 97535 SELF CARE MNGMENT TRAINING: CPT

## 2020-11-21 PROCEDURE — 81025 URINE PREGNANCY TEST: CPT

## 2020-11-21 RX ORDER — METFORMIN HYDROCHLORIDE 850 MG/1
1 TABLET ORAL
Qty: 0 | Refills: 0 | DISCHARGE

## 2020-11-21 RX ORDER — METFORMIN HYDROCHLORIDE 850 MG/1
1 TABLET ORAL
Qty: 60 | Refills: 0
Start: 2020-11-21 | End: 2020-12-20

## 2020-11-21 RX ORDER — OXYCODONE HYDROCHLORIDE 5 MG/1
1 TABLET ORAL
Qty: 42 | Refills: 0
Start: 2020-11-21 | End: 2020-11-27

## 2020-11-21 RX ORDER — OXYCODONE HYDROCHLORIDE 5 MG/1
1 TABLET ORAL
Qty: 0 | Refills: 0 | DISCHARGE
Start: 2020-11-21

## 2020-11-21 RX ADMIN — Medication 4 UNIT(S): at 09:26

## 2020-11-21 RX ADMIN — Medication 1: at 09:26

## 2020-11-21 RX ADMIN — OXYCODONE HYDROCHLORIDE 5 MILLIGRAM(S): 5 TABLET ORAL at 09:47

## 2020-11-21 RX ADMIN — OXYCODONE HYDROCHLORIDE 5 MILLIGRAM(S): 5 TABLET ORAL at 11:50

## 2020-11-21 NOTE — DISCHARGE NOTE NURSING/CASE MANAGEMENT/SOCIAL WORK - PATIENT PORTAL LINK FT
You can access the FollowMyHealth Patient Portal offered by [facility] by registering at the following website: [email or web address]. By joining [facility]’s FollowMyHealth portal, you will also be able to view your health information using other applications (apps) compatible with our system.

## 2020-11-21 NOTE — PROGRESS NOTE ADULT - ASSESSMENT
51 y/o FM s/p left elbow contracture release POD#3, d/c home today  Alondra Corley PA-C  Orthopaedic Surgery  Team pager [phone]  [facility] [phone]  [phone]

## 2020-11-21 NOTE — PROGRESS NOTE ADULT - SUBJECTIVE AND OBJECTIVE BOX
Patient is a 50y old  Female who presents with a chief complaint of Left elbow contracture  Patient s/p left elbow contracture release POD#3  Patient resting without complaints.  No chest pain, SOB, N/V.    T(C): 36.5 (11-21-20 @ 05:37), Max: 36.7 (11-20-20 @ 09:05)  HR: 63 (11-21-20 @ 05:37) (63 - 71)  BP: 124/86 (11-21-20 @ 05:37) (118/75 - 128/89)  RR: 18 (11-21-20 @ 05:37) (18 - 18)  SpO2: 97% (11-21-20 @ 05:37) (95% - 98%)    Exam:  Alert and Oriented, No Acute Distress  Cards: +S1/S2, RRR  Pulm: CTAB  Left U/E in extension splint, moving digits, sensation intact  Lower Extremities:  Calves Soft, Non-tender bilaterally  +PF/DF/EHL/FHL

## 2021-03-11 PROBLEM — E11.9 TYPE 2 DIABETES MELLITUS WITHOUT COMPLICATIONS: Chronic | Status: ACTIVE | Noted: 2020-11-11

## 2021-03-12 ENCOUNTER — APPOINTMENT (OUTPATIENT)
Dept: HEPATOLOGY | Facility: CLINIC | Age: 51
End: 2021-03-12
Payer: MEDICAID

## 2021-03-12 PROCEDURE — 99072 ADDL SUPL MATRL&STAF TM PHE: CPT

## 2021-03-12 PROCEDURE — 91200 LIVER ELASTOGRAPHY: CPT

## 2021-03-16 NOTE — DISCHARGE NOTE PROVIDER - NSDCQMPCI_CARD_ALL_CORE
[FreeTextEntry1] : Plan: After review of the history, physical examination, and imaging, the patient's symptoms are consistent with Bilateral lumbar radiculopathy. Patient reports 6-8 months of benefit from his last injection that was done in 2019 which improved his ability to walk and function. Of note patient is diabetic and reports that his last HBa1c was 7 (no lab work on file). Will order MRI L spine to as was last done in 2018 and another epidural injection may be indicated. After MRI will plan telemedicine appointment. Encouraged patient continue heat for pain as this helps him. Of note, patient is scheduled for Covid vaccination on 5/15.\par \par The patient expressed verbal understanding and is in agreement with the plan of care. All of the patient's questions and concerns were addressed during today's visit.\par \par I spent a total of 41 minutes on this encounter including documentation, face-to-face time and reviewing prior records. 
No

## 2021-04-30 ENCOUNTER — RESULT REVIEW (OUTPATIENT)
Age: 51
End: 2021-04-30

## 2021-06-14 ENCOUNTER — APPOINTMENT (OUTPATIENT)
Dept: HEPATOLOGY | Facility: CLINIC | Age: 51
End: 2021-06-14

## 2021-07-19 ENCOUNTER — APPOINTMENT (OUTPATIENT)
Dept: HEPATOLOGY | Facility: CLINIC | Age: 51
End: 2021-07-19
Payer: MEDICAID

## 2021-07-19 ENCOUNTER — NON-APPOINTMENT (OUTPATIENT)
Age: 51
End: 2021-07-19

## 2021-07-19 VITALS
HEART RATE: 63 BPM | OXYGEN SATURATION: 100 % | WEIGHT: 191 LBS | TEMPERATURE: 97 F | HEIGHT: 65 IN | SYSTOLIC BLOOD PRESSURE: 111 MMHG | BODY MASS INDEX: 31.82 KG/M2 | DIASTOLIC BLOOD PRESSURE: 71 MMHG

## 2021-07-19 DIAGNOSIS — Z63.5 DISRUPTION OF FAMILY BY SEPARATION AND DIVORCE: ICD-10-CM

## 2021-07-19 PROCEDURE — 99204 OFFICE O/P NEW MOD 45 MIN: CPT

## 2021-07-19 SDOH — SOCIAL STABILITY - SOCIAL INSECURITY: DISRUPTION OF FAMILY BY SEPARATION AND DIVORCE: Z63.5

## 2021-07-19 NOTE — ASSESSMENT
[FreeTextEntry1] : 50 yo F with obesity (BMI 31.8 kg/m2), NIDDM2 (with recent HbA1c 6.0% on 5/24/21), borderline hypertriglyceridemia, depression, multiple left elbow surgeries after prior mechanical fall, and history of hemorrhoidectomy, with elevated liver enzymes most likely secondary to nonalcoholic (metabolic) fatty liver disease (NAFLD) given her metabolic risk factors. She has no significant reported alcohol history.\par \par Laboratory work-up was ordered today to evaluate for other possible causes of chronic liver disease. Abdominal sonogram was ordered to evaluate for hepatic steatosis, evaluate the morphology of the liver, and rule out hepatocellular carcinoma (HCC). Additionally, given concern for possible cirrhosis based on FibroScan (done in 3/2021), I recommended that she undergo percutaneous liver biopsy for definitive diagnosis and staging, ordered today.\par \par She will return for follow-up in 1 month to discuss all of the results and discuss next steps in management, which likely will consist of lifestyle modifications as well as possible pharmacologic therapy, including the possibility of clinical trial enrollment if she interested and eligible.

## 2021-07-19 NOTE — HISTORY OF PRESENT ILLNESS
[de-identified] : Ms. Rizo is a 52 yo F with obesity (BMI 31.8 kg/m2), DM2 (diagnosed in ), borderline hypertriglyceridemia, depression, multiple left elbow surgeries after prior mechanical fall, and history of hemorrhoidectomy, who is being seen to establish care for abnormal liver enzymes and concern for fatty liver.\par \par PCP: Dr. Mariee Vas -> Dr. Olmos ? (forgot his last name)\par GI: Dr. Susana Guo\par \par FibroScan (3/12/21): Median liver stiffness 32.5 kPA (concerning for F4 fibrosis) and CAP score 309 dB/m (consistent with S1 steatosis).\par \par She reports that she was first told she had a fatty liver earlier this year. No prior liver biopsy. No past history of jaundice, scleral icterus, abdominal distension, confusion, or lower extremity swelling. She has chronic constipation and intermittent hemorrhoidal bleeding including after her recent hemorrhoidectomy. She underwent colonoscopy 7 years ago and supposed to have another soon with Dr. Guo, not yet scheduled. She previously underwent EGD on 21 with no known varices (to her knowledge).\par \par In addition to metformin for her diabetes, she takes an occasional PPI. She also takes vitamins C and E 1-2 times/week. No other herbal/dietary supplements. No herbal or green teas.\par \par She used to weigh 205 lbs at her heaviest approximately 3 months but has intentionally lost weight since then by eating less carbs.\par \par Father  of gastric cancer. Paternal uncle  of colon cancer (diagnosed in his 60s). Mother is alive, and previously had uterine cancer. Siblings with arthritis. Her daughter also has arthritis. No family history of diabetes, heart disease, or liver disease. No known family history of liver cancer.\par \par She drinks alcohol on special occasions, typically beer and wine, 0-1 times/week. No prior history of heavier alcohol consumption. Never smoker. No recreational drug use. Not currently working. She is . She lives with her sister-in-law and her sister-in-law's three children. She has a 30-year-old daughter who is pregnant with her first grandchild (girl), due next month. Her daughter lives in [place]. She moved from [facility] to the [place] age 23.

## 2021-07-19 NOTE — CONSULT LETTER
[Dear  ___] : Dear  [unfilled], [Courtesy Letter:] : I had the pleasure of seeing your patient, [unfilled], in my office today. [Please see my note below.] : Please see my note below. [Consult Closing:] : Thank you very much for allowing me to participate in the care of this patient.  If you have any questions, please do not hesitate to contact me. [FreeTextEntry2] : Dr. Susana Guo [FreeTextEntry3] : Sincerely,\par \par Manohar Abraham M.D., Ph.D.\par Director, Women's Liver Health Program, [facility]\par Transplant Hepatology, [facility]\par  of Medicine\par [employer] [facility]\par [address]\[place] [address]\par Tel: [phone]\par Fax: [phone]\par Cell: [phone]\par E-mail: [email or web address]

## 2021-07-20 LAB
AFP-TM SERPL-MCNC: 5.1 NG/ML
ALBUMIN SERPL ELPH-MCNC: 4 G/DL
ALP BLD-CCNC: 140 U/L
ALT SERPL-CCNC: 30 U/L
ANION GAP SERPL CALC-SCNC: 10 MMOL/L
APTT BLD: 44.9 SEC
AST SERPL-CCNC: 50 U/L
BASOPHILS # BLD AUTO: 0.08 K/UL
BASOPHILS NFR BLD AUTO: 1 %
BILIRUB DIRECT SERPL-MCNC: 0.1 MG/DL
BILIRUB SERPL-MCNC: 0.2 MG/DL
BUN SERPL-MCNC: 8 MG/DL
CALCIUM SERPL-MCNC: 8.9 MG/DL
CERULOPLASMIN SERPL-MCNC: 24 MG/DL
CHLORIDE SERPL-SCNC: 107 MMOL/L
CHOLEST SERPL-MCNC: 167 MG/DL
CO2 SERPL-SCNC: 23 MMOL/L
CREAT SERPL-MCNC: 0.65 MG/DL
DEPRECATED KAPPA LC FREE/LAMBDA SER: 1.23 RATIO
EOSINOPHIL # BLD AUTO: 0.27 K/UL
EOSINOPHIL NFR BLD AUTO: 3.3 %
ESTIMATED AVERAGE GLUCOSE: 111 MG/DL
FERRITIN SERPL-MCNC: 95 NG/ML
GGT SERPL-CCNC: 81 U/L
GLUCOSE SERPL-MCNC: 106 MG/DL
HBA1C MFR BLD HPLC: 5.5 %
HBV CORE IGG+IGM SER QL: NONREACTIVE
HBV SURFACE AB SERPL IA-ACNC: <3 MIU/ML
HBV SURFACE AG SER QL: NONREACTIVE
HCT VFR BLD CALC: 43.5 %
HCV AB SER QL: NONREACTIVE
HCV S/CO RATIO: 0.24 S/CO
HDLC SERPL-MCNC: 56 MG/DL
HEPATITIS A IGG ANTIBODY: REACTIVE
HGB BLD-MCNC: 13.5 G/DL
HIV1+2 AB SPEC QL IA.RAPID: NONREACTIVE
IGA SER QL IEP: 328 MG/DL
IGG SER QL IEP: 1537 MG/DL
IGM SER QL IEP: 159 MG/DL
IMM GRANULOCYTES NFR BLD AUTO: 0.4 %
INR PPP: 1.24 RATIO
IRON SATN MFR SERPL: 15 %
IRON SERPL-MCNC: 51 UG/DL
KAPPA LC CSF-MCNC: 2.25 MG/DL
KAPPA LC SERPL-MCNC: 2.77 MG/DL
LDLC SERPL CALC-MCNC: 82 MG/DL
LYMPHOCYTES # BLD AUTO: 2.99 K/UL
LYMPHOCYTES NFR BLD AUTO: 36.1 %
MAN DIFF?: NORMAL
MCHC RBC-ENTMCNC: 30.7 PG
MCHC RBC-ENTMCNC: 31 GM/DL
MCV RBC AUTO: 98.9 FL
MONOCYTES # BLD AUTO: 0.77 K/UL
MONOCYTES NFR BLD AUTO: 9.3 %
NEUTROPHILS # BLD AUTO: 4.14 K/UL
NEUTROPHILS NFR BLD AUTO: 49.9 %
NONHDLC SERPL-MCNC: 111 MG/DL
PLATELET # BLD AUTO: 239 K/UL
POTASSIUM SERPL-SCNC: 4.4 MMOL/L
PROT SERPL-MCNC: 6.9 G/DL
PT BLD: 14.5 SEC
RBC # BLD: 4.4 M/UL
RBC # FLD: 12.8 %
SODIUM SERPL-SCNC: 139 MMOL/L
TIBC SERPL-MCNC: 351 UG/DL
TRIGL SERPL-MCNC: 141 MG/DL
UIBC SERPL-MCNC: 300 UG/DL
WBC # FLD AUTO: 8.28 K/UL

## 2021-07-22 LAB
ANA PAT FLD IF-IMP: ABNORMAL
ANA SER IF-ACNC: ABNORMAL
MITOCHONDRIA AB SER IF-ACNC: NORMAL
SMOOTH MUSCLE AB SER QL IF: ABNORMAL

## 2021-07-23 ENCOUNTER — NON-APPOINTMENT (OUTPATIENT)
Age: 51
End: 2021-07-23

## 2021-07-23 LAB
A1AT PHENOTYP SERPL-IMP: NORMAL
A1AT SERPL-MCNC: 164 MG/DL

## 2021-08-02 ENCOUNTER — APPOINTMENT (OUTPATIENT)
Dept: ULTRASOUND IMAGING | Facility: HOSPITAL | Age: 51
End: 2021-08-02

## 2021-08-04 ENCOUNTER — NON-APPOINTMENT (OUTPATIENT)
Age: 51
End: 2021-08-04

## 2021-08-05 LAB
LYSOSOMAL ACID LIPASE INTERPRETATION: NORMAL
LYSOSOMAL ACID LIPASE: 119 CD:384473389

## 2021-08-09 ENCOUNTER — OUTPATIENT (OUTPATIENT)
Dept: OUTPATIENT SERVICES | Facility: HOSPITAL | Age: 51
LOS: 1 days | End: 2021-08-09
Payer: COMMERCIAL

## 2021-08-09 DIAGNOSIS — Z98.891 HISTORY OF UTERINE SCAR FROM PREVIOUS SURGERY: Chronic | ICD-10-CM

## 2021-08-09 DIAGNOSIS — S52.123A DISPLACED FRACTURE OF HEAD OF UNSPECIFIED RADIUS, INITIAL ENCOUNTER FOR CLOSED FRACTURE: Chronic | ICD-10-CM

## 2021-08-09 DIAGNOSIS — Z98.890 OTHER SPECIFIED POSTPROCEDURAL STATES: Chronic | ICD-10-CM

## 2021-08-09 DIAGNOSIS — Z11.52 ENCOUNTER FOR SCREENING FOR COVID-19: ICD-10-CM

## 2021-08-09 LAB — SARS-COV-2 RNA SPEC QL NAA+PROBE: SIGNIFICANT CHANGE UP

## 2021-08-09 PROCEDURE — U0005: CPT

## 2021-08-09 PROCEDURE — U0003: CPT

## 2021-08-09 PROCEDURE — C9803: CPT

## 2021-08-12 ENCOUNTER — RESULT REVIEW (OUTPATIENT)
Age: 51
End: 2021-08-12

## 2021-08-12 ENCOUNTER — APPOINTMENT (OUTPATIENT)
Dept: ULTRASOUND IMAGING | Facility: HOSPITAL | Age: 51
End: 2021-08-12
Payer: MEDICAID

## 2021-08-12 ENCOUNTER — OUTPATIENT (OUTPATIENT)
Dept: OUTPATIENT SERVICES | Facility: HOSPITAL | Age: 51
LOS: 1 days | End: 2021-08-12
Payer: MEDICAID

## 2021-08-12 DIAGNOSIS — Z98.890 OTHER SPECIFIED POSTPROCEDURAL STATES: Chronic | ICD-10-CM

## 2021-08-12 DIAGNOSIS — S52.123A DISPLACED FRACTURE OF HEAD OF UNSPECIFIED RADIUS, INITIAL ENCOUNTER FOR CLOSED FRACTURE: Chronic | ICD-10-CM

## 2021-08-12 DIAGNOSIS — Z98.891 HISTORY OF UTERINE SCAR FROM PREVIOUS SURGERY: Chronic | ICD-10-CM

## 2021-08-12 DIAGNOSIS — K74.00 HEPATIC FIBROSIS, UNSPECIFIED: ICD-10-CM

## 2021-08-12 PROCEDURE — 47000 NEEDLE BIOPSY OF LIVER PERQ: CPT

## 2021-08-12 PROCEDURE — 88307 TISSUE EXAM BY PATHOLOGIST: CPT | Mod: 26

## 2021-08-12 PROCEDURE — 76942 ECHO GUIDE FOR BIOPSY: CPT

## 2021-08-12 PROCEDURE — 88313 SPECIAL STAINS GROUP 2: CPT

## 2021-08-12 PROCEDURE — 88313 SPECIAL STAINS GROUP 2: CPT | Mod: 26

## 2021-08-12 PROCEDURE — 88307 TISSUE EXAM BY PATHOLOGIST: CPT

## 2021-08-12 PROCEDURE — 76942 ECHO GUIDE FOR BIOPSY: CPT | Mod: 26

## 2021-08-13 ENCOUNTER — NON-APPOINTMENT (OUTPATIENT)
Age: 51
End: 2021-08-13

## 2021-08-16 LAB — SURGICAL PATHOLOGY STUDY: SIGNIFICANT CHANGE UP

## 2021-08-18 ENCOUNTER — APPOINTMENT (OUTPATIENT)
Dept: HEPATOLOGY | Facility: CLINIC | Age: 51
End: 2021-08-18
Payer: MEDICAID

## 2021-08-18 VITALS
HEIGHT: 65 IN | BODY MASS INDEX: 31.65 KG/M2 | OXYGEN SATURATION: 100 % | SYSTOLIC BLOOD PRESSURE: 118 MMHG | WEIGHT: 190 LBS | DIASTOLIC BLOOD PRESSURE: 82 MMHG | TEMPERATURE: 97 F | HEART RATE: 83 BPM

## 2021-08-18 PROCEDURE — 99215 OFFICE O/P EST HI 40 MIN: CPT

## 2021-08-18 NOTE — HISTORY OF PRESENT ILLNESS
[de-identified] : Ms. Rizo is a 52 yo F with obesity (BMI 31.8 kg/m2), NIDDM2 (diagnosed in ), borderline hypertriglyceridemia, depression, multiple left elbow surgeries after prior mechanical fall, and history of hemorrhoidectomy, who is being seen for follow-up of abnormal liver enzymes and concern for possible cirrhosis.\par \par PCP: Dr. Jaylene Torres -> Dr. Olmos ? (forgot his last name)\par GI: Dr. Susana Guo = referring physician\par \par FibroScan (3/12/21): Median liver stiffness 32.5 kPA (concerning for F4 fibrosis) and CAP score 309 dB/m (consistent with S1 steatosis).\par \par CT abdomen/pelvis with contrast (3/22/21): The liver is fatty with a slightly nodular surface and hypertrophy of the caudate and left lateral segment, consistent with cirrhosis. No focal liver lesion is noted. Normal spleen, biliary tree, and gallbladder. No ascites. Small renal cysts. \par \par EGD (21): No esophageal varices. Diffuse mild gastric inflammation, biopsied. Normal duodenum. \par \par Abdominal US (21, [facility]): Mildly enlarged liver with increased echogenicity compatible with diffuse fatty infiltration. Subtle nodular contour to the liver as seen on CT scan, compatible with cirrhosis. Normal spleen size (12.2 cm). No ascites. Normal caliber bile ducts. No cholelithiasis. \par \par She was first seen by me on 21. Laboratory work-up from that date was notable for positive SUSIE with 1:320 titer and speckled pattern, positive ASMA with 1:40 titer, and normal IgG. Calculated laboratory-based scores to assess for advanced fibrosis (FIB-4 = 1.87 and NFS = 0.16) were both indeterminant. In order to distinguish between WARNER and autoimmune hepatitis and to definitively stage her hepatic fibrosis, she was recommended to undergo percutaneous liver biopsy. She is here today to discuss the results (below).\par \par Right lobe of liver, biopsy (21): Steatohepatitis, see note.\par - Note: There is mild macrovesicular steatosis, ballooning change and Emily bodies. There is moderate portal and lobular inflammation with focal interface activity; a few acidophil bodies are present. There are no granulomas and there is no cholestasis. The trichrome stain shows widespread bridging fibrosis and focal nodule formation; foci of pericellular/perisinusoidal fibrosis are also present. Stainable iron is not increased. There are no PASD globules in hepatocytes. The reticulin stain confirms the areas of fibrosis and collapsed reticulin.\par - The WARNER CRN scoring system is as follows: steatosis - 1/3; lobular inflammation - 2/3; ballooning - 2/2; and fibrosis 3-4/4.\par \par She is here today to discuss her biopsy results and plan for management. She is currently feeling well apart from chronic constipation with an associated sensation of incomplete evacuation of her bowels after defecation.\par \par She used to weigh 205 lbs at her heaviest approximately 3 months but has intentionally lost weight since then by eating less carbs.\par \par Father  of gastric cancer. Paternal uncle  of colon cancer (diagnosed in his 60s). Mother is alive, and previously had uterine cancer. Siblings with arthritis. Her daughter also has arthritis. No family history of diabetes, heart disease, or liver disease. No known family history of liver cancer.\par \par She drinks alcohol on special occasions, typically beer and wine, 0-1 times/week. No prior history of heavier alcohol consumption. Never smoker. No recreational drug use. Not currently working. She is . She lives with her sister-in-law and her sister-in-law's three children. She has a 30-year-old daughter who is pregnant with her first grandchild (girl), due this month. Her daughter lives in [place]. She moved from [facility] [place] 23.

## 2021-08-18 NOTE — ASSESSMENT
[FreeTextEntry1] : 50 yo F with obesity (BMI 31.6 kg/m2), NIDDM2 (with recent HbA1c 6.0% on 5/24/21), borderline hypertriglyceridemia, depression, multiple left elbow surgeries after prior mechanical fall, and history of hemorrhoidectomy, with biopsy-proven nonalcoholic steatohepatitis (WARNER) with associated advanced hepatic fibrosis (F3-4) and possible early compensated cirrhosis.\par \par Prior laboratory work-up was notable for positive SUSIE with 1:320 titer and positive ASMA with 1:40 titer, but her recent biopsy did not show histologic evidence of autoimmune hepatitis.\par \par Although her recent labs (7/19/21) showed normal platelet count >200 and preserved liver synthetic function, also with indeterminant laboratory-based fibrosis scores (FIB-4 and NAFLD fibrosis score), there is concern for early compensated cirrhosis based on her biopsy results that showed histologic evidence of focal nodule formation, and also based on her prior FibroScan (with median liver stiffness >20 kPA) and the morphology of her liver on prior CT (3/2021) and US (7/2021). She has no history of overt hepatic decompensations and did not have any upper GI varices seen on EGD (4/30/21). US (7/26/21) also did not show any evidence of HCC.\par \par We discussed the diagnosis of WARNER at length today. I reviewed the natural history, evaluation and staging of the disease including her biopsy findings in detail. We discussed that WARNER is potentially reversible, but that it can eventually progress to decompensated (symptomatic) cirrhosis and a risk of hepatocellular carcinoma (HCC), as well as increased associated lifetime risks of diabetes mellitus, chronic kidney disease, and cardiovascular disease.\par \par Given biopsy evidence of advanced hepatic fibrosis and possible early compensated cirrhosis, I recommended a multimodal approach to management of her WARNER, including: (1) lifestyle modifications (discussed below) and (2) pharmacologic therapy. She is interested in participating in WARNER clinical trials if eligible and will be screened for eligibility (including with central site review of her recent biopsy results). If ineligible for our actively enrolling trials, then at her next visit we will discuss starting vitamin E or off-label semaglutide (instead of metformin). We also discussed the need for q6 month surveillance for HCC, with US next due in 1/2022. She will also need repeat EGD to screen for varices in 2 years (4/2023), or earlier if she develops decompensated disease.\par \par We discussed that lifestyle modifications are crucial for management of WARNER. I recommended gradual weight loss. I recommended dietary changes including coffee consumption (up to 3-4 cups/day if desired), adherence to a Mediterranean style diet with increased consumption of vegetables and lean proteins, avoidance of red meat and high fructose corn syrup, and avoidance of calorie-containing beverages. The plate method (1/4 plate lean proteins, 1/4 plate starch, and 1/2 plate raw or cooked vegetables) was discussed today. I will refer her today to nutritionist Uma Coleman through the [facility] for additional, personalized dietary counseling. I recommended moderate intensity exercise for a minimum of 20-30 minutes at least 3 times per week. These changes have been shown to lead to regression or even resolution of steatosis, inflammation, and even fibrosis in some patients.\par \par She is HAV immune but HBV non-immune and would benefit from vaccination.\par \par Given concern for possible cirrhosis, Ms. PADILLA was counseled to: abstain from alcohol and all illicit drugs; avoid use of herbal and dietary supplements due to potential hepatotoxicity; limit use of acetaminophen to <2 grams per day; avoid use of nonsteroidal antiinflammatory drugs (NSAIDs) as these can lead to diuretic resistance and precipitate renal dysfunction in patients with advanced liver disease; avoid eating any unpasteurized dairy products; avoid eating any raw or undercooked eggs, fish, poultry, or meat; and avoid eating raw/steamed oysters or other shellfish to avoid risk of Vibrio infection.\par \par Next follow-up: 6-8 weeks

## 2021-08-18 NOTE — CONSULT LETTER
[Dear  ___] : Dear  [unfilled], [Courtesy Letter:] : I had the pleasure of seeing your patient, [unfilled], in my office today. [Please see my note below.] : Please see my note below. [Consult Closing:] : Thank you very much for allowing me to participate in the care of this patient.  If you have any questions, please do not hesitate to contact me. [FreeTextEntry2] : Dr. Susana Guo [FreeTextEntry1] : Her biopsy confirmed WARNER with advanced (at least F3) fibrosis and possible early cirrhosis. We are exploring whether she is eligible to participate in any of our actively enrolling clinical trials for WARNER, but she may be excluded due to the concern for cirrhosis (including based on her prior FibroScan and imaging). If so, I will discuss with her pharmacologic treatment with vitamin E versus semaglutide (off label). We also discussed lifestyle modifications at length today. [FreeTextEntry3] : Sincerely,\par \par Manohar Abraham M.D., Ph.D.\par Director, Women's Liver Health Program, [facility]\par Transplant Hepatology, [facility]\par  of Medicine\par [employer] [facility] [employer] [facility]\par [address]\[place] [address]\par Tel: [phone]\par Fax: [phone]\par Cell: [phone]\par E-mail: [email or web address]

## 2022-03-23 NOTE — PRE-ANESTHESIA EVALUATION ADULT - NSANTHOSAYNRD_GEN_A_CORE
No. LUIS screening performed.  STOP BANG Legend: 0-2 = LOW Risk; 3-4 = INTERMEDIATE Risk; 5-8 = HIGH Risk no

## 2022-06-30 ENCOUNTER — APPOINTMENT (OUTPATIENT)
Dept: HEPATOLOGY | Facility: CLINIC | Age: 52
End: 2022-06-30

## 2022-06-30 ENCOUNTER — NON-APPOINTMENT (OUTPATIENT)
Age: 52
End: 2022-06-30

## 2022-06-30 VITALS
HEIGHT: 65 IN | TEMPERATURE: 97.7 F | OXYGEN SATURATION: 96 % | WEIGHT: 198 LBS | RESPIRATION RATE: 16 BRPM | DIASTOLIC BLOOD PRESSURE: 82 MMHG | SYSTOLIC BLOOD PRESSURE: 113 MMHG | HEART RATE: 76 BPM | BODY MASS INDEX: 32.99 KG/M2

## 2022-06-30 LAB
AFP-TM SERPL-MCNC: 5.6 NG/ML
ALBUMIN SERPL ELPH-MCNC: 4.2 G/DL
ALP BLD-CCNC: 145 U/L
ALT SERPL-CCNC: 23 U/L
ANION GAP SERPL CALC-SCNC: 10 MMOL/L
AST SERPL-CCNC: 30 U/L
BASOPHILS # BLD AUTO: 0.06 K/UL
BASOPHILS NFR BLD AUTO: 1 %
BILIRUB DIRECT SERPL-MCNC: 0.1 MG/DL
BILIRUB SERPL-MCNC: 0.4 MG/DL
BUN SERPL-MCNC: 8 MG/DL
CALCIUM SERPL-MCNC: 9.2 MG/DL
CHLORIDE SERPL-SCNC: 106 MMOL/L
CHOLEST SERPL-MCNC: 174 MG/DL
CO2 SERPL-SCNC: 24 MMOL/L
CREAT SERPL-MCNC: 0.59 MG/DL
EGFR: 110 ML/MIN/1.73M2
EOSINOPHIL # BLD AUTO: 0.28 K/UL
EOSINOPHIL NFR BLD AUTO: 4.6 %
ESTIMATED AVERAGE GLUCOSE: 192 MG/DL
GLUCOSE SERPL-MCNC: 128 MG/DL
HBA1C MFR BLD HPLC: 8.3 %
HCT VFR BLD CALC: 41.4 %
HDLC SERPL-MCNC: 66 MG/DL
HGB BLD-MCNC: 13.8 G/DL
IMM GRANULOCYTES NFR BLD AUTO: 0.3 %
INR PPP: 1.29 RATIO
LDLC SERPL CALC-MCNC: 81 MG/DL
LYMPHOCYTES # BLD AUTO: 2.03 K/UL
LYMPHOCYTES NFR BLD AUTO: 33.2 %
MAN DIFF?: NORMAL
MCHC RBC-ENTMCNC: 30.7 PG
MCHC RBC-ENTMCNC: 33.3 GM/DL
MCV RBC AUTO: 92.2 FL
MONOCYTES # BLD AUTO: 0.62 K/UL
MONOCYTES NFR BLD AUTO: 10.1 %
NEUTROPHILS # BLD AUTO: 3.11 K/UL
NEUTROPHILS NFR BLD AUTO: 50.8 %
NONHDLC SERPL-MCNC: 108 MG/DL
PLATELET # BLD AUTO: 192 K/UL
POTASSIUM SERPL-SCNC: 4.2 MMOL/L
PROT SERPL-MCNC: 7.4 G/DL
PT BLD: 15.1 SEC
RBC # BLD: 4.49 M/UL
RBC # FLD: 12.6 %
SODIUM SERPL-SCNC: 141 MMOL/L
TRIGL SERPL-MCNC: 131 MG/DL
WBC # FLD AUTO: 6.12 K/UL

## 2022-06-30 PROCEDURE — 99214 OFFICE O/P EST MOD 30 MIN: CPT

## 2022-06-30 RX ORDER — BLOOD-GLUCOSE METER
W/DEVICE KIT MISCELLANEOUS
Qty: 1 | Refills: 0 | Status: ACTIVE | COMMUNITY
Start: 2022-06-15

## 2022-06-30 RX ORDER — BLOOD SUGAR DIAGNOSTIC
STRIP MISCELLANEOUS
Qty: 100 | Refills: 0 | Status: ACTIVE | COMMUNITY
Start: 2022-06-15

## 2022-06-30 NOTE — ASSESSMENT
[FreeTextEntry1] : 53 yo F with obesity, NIDDM2, borderline hypertriglyceridemia, depression, multiple left elbow surgeries after prior mechanical fall, and history of hemorrhoidectomy, with biopsy-proven nonalcoholic steatohepatitis (WARNER) with associated advanced hepatic fibrosis (F3-4) and possible early compensated cirrhosis.\par \par Prior laboratory work-up was notable for positive SUSIE with 1:320 titer and positive ASMA with 1:40 titer, but her liver biopsy did not show histologic evidence of autoimmune hepatitis.\par \par There is concern for early compensated cirrhosis based on her biopsy results that showed histologic evidence of focal nodule formation and also based on the morphology of her liver on prior CT (3/2021) and US (7/2021). She has no history of overt hepatic decompensations and did not have any upper GI varices seen on EGD (4/30/21). US (7/26/21) also did not show any evidence of HCC, but she is overdue for q6 month surveillance for HCC. This was discussed with her today with US re-ordered today. AFP ordered with labs today. She will also need repeat EGD to screen for varices in 2 years (4/2023), or earlier if she develops decompensated disease.\par \par We have discussed the diagnosis of WARNER at length. I reviewed the natural history, evaluation and staging of the disease including her biopsy findings in detail. We discussed that WARNER is potentially reversible, but that it can eventually progress to decompensated (symptomatic) cirrhosis and a risk of hepatocellular carcinoma (HCC), as well as increased associated lifetime risks of diabetes mellitus, chronic kidney disease, and cardiovascular disease.\par \par Given biopsy evidence of advanced hepatic fibrosis and possible early compensated cirrhosis, I previously recommended a multimodal approach to management of her WARNER, including: (1) lifestyle modifications (discussed below) and (2) pharmacologic therapy. As she is not currently eligible for any of our actively enrolling WARNER clinical trials, we will plan to start semaglutide off label also to help with weight loss. The potential benefits and risks were discussed with her today. We would discontinue metformin once on semaglutide given last HbA1c was normal (unlikely to need both medications). \par \par We discussed that lifestyle modifications are crucial for management of WARNER. I recommended gradual weight loss. I recommended dietary changes including coffee consumption (up to 3-4 cups/day if desired), adherence to a Mediterranean style diet with increased consumption of vegetables and lean proteins, avoidance of red meat and high fructose corn syrup, and avoidance of calorie-containing beverages. The plate method (1/4 plate lean proteins, 1/4 plate starch, and 1/2 plate raw or cooked vegetables) was previously discussed. I previously referred her to nutritionist Uma Coleman through the [facility] for additional, personalized dietary counseling, but this was not a covered service for her. I recommended moderate intensity exercise for a minimum of 20-30 minutes at least 3 times per week. These changes have been shown to lead to regression or even resolution of steatosis, inflammation, and even fibrosis in some patients.\par \par She is HAV immune but HBV non-immune and would benefit from vaccination.\par \par Given concern for possible cirrhosis, Ms. PADILLA was counseled to: abstain from alcohol and all illicit drugs; avoid use of herbal and dietary supplements due to potential hepatotoxicity; limit use of acetaminophen to <2 grams per day; avoid use of nonsteroidal antiinflammatory drugs (NSAIDs) as these can lead to diuretic resistance and precipitate renal dysfunction in patients with advanced liver disease; avoid eating any unpasteurized dairy products; avoid eating any raw or undercooked eggs, fish, poultry, or meat; and avoid eating raw/steamed oysters or other shellfish to avoid risk of Vibrio infection.\par \par Next follow-up: 6 months

## 2022-06-30 NOTE — HISTORY OF PRESENT ILLNESS
[de-identified] : Ms. Rizo is a 51 yo F with obesity, NIDDM2 (diagnosed in ), borderline hypertriglyceridemia, depression, chronic constipation, multiple left elbow surgeries after prior mechanical fall, and history of hemorrhoidectomy, who is being seen for follow-up of biopsy-proven WARNER with concern for possible compensated cirrhosis.\par \par PCP: Dr. Jaylene Torres\par GI: Dr. Susana Guo = referring physician\par \par FibroScan (3/12/21): Median liver stiffness 32.5 kPA (concerning for F4 fibrosis) and CAP score 309 dB/m (consistent with S1 steatosis).\par \par CT abdomen/pelvis with contrast (3/22/21): The liver is fatty with a slightly nodular surface and hypertrophy of the caudate and left lateral segment, consistent with cirrhosis. No focal liver lesion is noted. Normal spleen, biliary tree, and gallbladder. No ascites. Small renal cysts. \par \par EGD (21): No esophageal varices. Diffuse mild gastric inflammation, biopsied. Normal duodenum. \par \par Abdominal US (21, [facility]): Mildly enlarged liver with increased echogenicity compatible with diffuse fatty infiltration. Subtle nodular contour to the liver as seen on CT scan, compatible with cirrhosis. Normal spleen size (12.2 cm). No ascites. Normal caliber bile ducts. No cholelithiasis. \par \par Right lobe of liver, biopsy (21): Steatohepatitis, see note.\par - Note: There is mild macrovesicular steatosis, ballooning change and Emily bodies. There is moderate portal and lobular inflammation with focal interface activity; a few acidophil bodies are present. There are no granulomas and there is no cholestasis. The trichrome stain shows widespread bridging fibrosis and focal nodule formation; foci of pericellular/perisinusoidal fibrosis are also present. Stainable iron is not increased. There are no PASD globules in hepatocytes. The reticulin stain confirms the areas of fibrosis and collapsed reticulin.\par - The WARNER CRN scoring system is as follows: steatosis - 1/3; lobular inflammation - 2/3; ballooning - 2/2; and fibrosis 3-4/4.\par \par She was last seen by me on 21 and is here today for follow-up. She has not had any new liver imaging (overdue). She is planning for CRS surgery due to "scar" from prior hemorrhoidectomy. She was also recently started on rifampin 600 mg po daily 2 months ago by her PCP for latent TB.\par \par Father  of gastric cancer. Paternal uncle  of colon cancer (diagnosed in his 60s). Mother is alive, and previously had uterine cancer. Siblings with arthritis. Her daughter also has arthritis. No family history of diabetes, heart disease, or liver disease. No known family history of liver cancer.\par \par She drinks alcohol on special occasions, typically beer and wine, 0-1 times/week. No prior history of heavier alcohol consumption. Never smoker. No recreational drug use. Not currently working. She is . She lives with her sister-in-law and her sister-in-law's three children. She has a 31-year-old daughter who lives in [place] and one grandchild. She moved from [facility] [place] age 23.

## 2022-06-30 NOTE — CONSULT LETTER
[Dear  ___] : Dear  [unfilled], [Courtesy Letter:] : I had the pleasure of seeing your patient, [unfilled], in my office today. [Please see my note below.] : Please see my note below. [Consult Closing:] : Thank you very much for allowing me to participate in the care of this patient.  If you have any questions, please do not hesitate to contact me. [FreeTextEntry2] : Dr. Susana Guo [FreeTextEntry1] : Her biopsy confirmed WARNER with advanced (at least F3) fibrosis and possible early cirrhosis. We are exploring whether she is eligible to participate in any of our actively enrolling clinical trials for WARNER, but she may be excluded due to the concern for cirrhosis (including based on her prior FibroScan and imaging). If so, I will discuss with her pharmacologic treatment with vitamin E versus semaglutide (off label). We also discussed lifestyle modifications at length today. [FreeTextEntry3] : Sincerely,\par \par Manohar Abraham M.D., Ph.D.\par Director, Women's Liver Health Program, [facility]\par Transplant Hepatology, [facility]\par  of Medicine\par [employer] [facility] [employer] [facility] at [facility]\par [address]\[place] [address]\par Tel: [phone]\par Fax: [phone]\par Cell: [phone]\par E-mail: [email or web address]

## 2022-07-06 ENCOUNTER — NON-APPOINTMENT (OUTPATIENT)
Age: 52
End: 2022-07-06

## 2022-07-15 ENCOUNTER — RESULT REVIEW (OUTPATIENT)
Age: 52
End: 2022-07-15

## 2022-07-15 ENCOUNTER — OUTPATIENT (OUTPATIENT)
Dept: OUTPATIENT SERVICES | Facility: HOSPITAL | Age: 52
LOS: 1 days | End: 2022-07-15
Payer: MEDICAID

## 2022-07-15 ENCOUNTER — APPOINTMENT (OUTPATIENT)
Dept: ULTRASOUND IMAGING | Facility: CLINIC | Age: 52
End: 2022-07-15

## 2022-07-15 DIAGNOSIS — Z00.8 ENCOUNTER FOR OTHER GENERAL EXAMINATION: ICD-10-CM

## 2022-07-15 DIAGNOSIS — K75.81 NONALCOHOLIC STEATOHEPATITIS (NASH): ICD-10-CM

## 2022-07-15 DIAGNOSIS — S52.123A DISPLACED FRACTURE OF HEAD OF UNSPECIFIED RADIUS, INITIAL ENCOUNTER FOR CLOSED FRACTURE: Chronic | ICD-10-CM

## 2022-07-15 DIAGNOSIS — Z98.890 OTHER SPECIFIED POSTPROCEDURAL STATES: Chronic | ICD-10-CM

## 2022-07-15 DIAGNOSIS — Z98.891 HISTORY OF UTERINE SCAR FROM PREVIOUS SURGERY: Chronic | ICD-10-CM

## 2022-07-15 PROCEDURE — 76700 US EXAM ABDOM COMPLETE: CPT

## 2022-07-15 PROCEDURE — 76700 US EXAM ABDOM COMPLETE: CPT | Mod: 26

## 2022-07-19 ENCOUNTER — NON-APPOINTMENT (OUTPATIENT)
Age: 52
End: 2022-07-19

## 2022-08-18 ENCOUNTER — RX RENEWAL (OUTPATIENT)
Age: 52
End: 2022-08-18

## 2022-08-18 ENCOUNTER — NON-APPOINTMENT (OUTPATIENT)
Age: 52
End: 2022-08-18

## 2023-01-04 ENCOUNTER — APPOINTMENT (OUTPATIENT)
Dept: HEPATOLOGY | Facility: CLINIC | Age: 53
End: 2023-01-04

## 2023-03-09 ENCOUNTER — APPOINTMENT (OUTPATIENT)
Dept: HEPATOLOGY | Facility: CLINIC | Age: 53
End: 2023-03-09
Payer: MEDICAID

## 2023-03-09 VITALS
HEIGHT: 65 IN | WEIGHT: 194 LBS | TEMPERATURE: 97.8 F | HEART RATE: 85 BPM | RESPIRATION RATE: 16 BRPM | DIASTOLIC BLOOD PRESSURE: 68 MMHG | OXYGEN SATURATION: 97 % | SYSTOLIC BLOOD PRESSURE: 94 MMHG | BODY MASS INDEX: 32.32 KG/M2

## 2023-03-09 DIAGNOSIS — E66.9 OBESITY, UNSPECIFIED: ICD-10-CM

## 2023-03-09 PROCEDURE — 99215 OFFICE O/P EST HI 40 MIN: CPT

## 2023-03-10 NOTE — ASSESSMENT
[FreeTextEntry1] : Ms. TRINY PADILLA is 52-year-old female who is being seen for follow up visit with biopsy-proven WARNER with concern for possible compensated cirrhosis.  \par \par WNL-US ab on 07/15/2022: Heterogeneous echotexture to liver. Labs on 06/30/2022: Isolated , A1C 8.3%, INR 1.29, normalized AST 30, WDL-DBil, AFP, Lipids, CBC.  \par \par # WARNER - with biopsy-proven nonalcoholic steatohepatitis (WARNER) \par # Cirrhosis + FibroScan (3/12/21): Median liver stiffness 32.5 kPa (F4 fibrosis) \par + Fibrosis - associated advanced hepatic fibrosis (F3-4) and possible early compensated cirrhosis. \par > HCC screening: No focal lesion in the reviewed imaging and labs with Normal AFP. but she is overdue for q6 month surveillance for HCC. This was discussed with her today with US re-ordered today. AFP ordered with labs today. \par \par > No history of overt hepatic decompensations. \par > No upper GI varices seen on EGD (4/30/21). She will also need repeat EGD to screen for varices in 2 years (4/2023) ordered, or earlier if she develops decompensated disease. \par \par + Risk Factors: PH/o obesity, NIDDM2 (diagnosed in 2020), borderline hypertriglyceridemia.  \par MH/o depression, multiple left elbow surgeries after prior mechanical fall, and history of hemorrhoidectomy, chronic constipation. \par \par ? AILD - Prior laboratory work-up was notable for positive SUSIE with 1:320 titer and positive ASMA with 1:40 titer, but her liver biopsy did not show histologic evidence of autoimmune hepatitis. \par \par # Fatty Liver - Right lobe of liver, biopsy (8/12/21): +Steatohepatitis, steatosis - 1/3; lobular inflammation - 2/3; ballooning - 2/2; FS with  dB/m (S1 steatosis). \par \par Given biopsy evidence of advanced hepatic fibrosis and possible early compensated cirrhosis, I previously recommended a multimodal approach to management of her WARNER, including: (1) lifestyle modifications (discussed below) and (2) pharmacologic therapy.  \par \par She is enrolling today for WARNER clinical trials.  \par C/w Ozempic SQ weekly, to help with weight loss. The potential benefits and risks were discussed with her today. \par \par + A1C – Will c/w with metformin as the last HbA1c ^ which was normal, rise probably r/t to her stopping the metformin.  \par + HAV immune but not to HBV and would benefit from vaccination, advised to get it from the PCP or call to schedule when committed to both doses. \par \par PLAN to follow-up in 6 months. \par Encouraged to call back in the interim with any issues or concerns so that we can address and assist as required.

## 2023-03-10 NOTE — CONSULT LETTER
[Dear  ___] : Dear  [unfilled], [Courtesy Letter:] : I had the pleasure of seeing your patient, [unfilled], in my office today. [Please see my note below.] : Please see my note below. [Consult Closing:] : Thank you very much for allowing me to participate in the care of this patient.  If you have any questions, please do not hesitate to contact me. [FreeTextEntry2] : Dr. Susana Guo [FreeTextEntry1] : Her biopsy confirmed WARNER with advanced (at least F3) fibrosis and possible early cirrhosis. We are exploring whether she is eligible to participate in any of our actively enrolling clinical trials for WARNER, but she may be excluded due to the concern for cirrhosis (including based on her prior FibroScan and imaging). If so, I will discuss with her pharmacologic treatment with vitamin E versus semaglutide (off label). We also discussed lifestyle modifications at length today. [FreeTextEntry3] : Sincerely,\par \par Manohar Abraham M.D., Ph.D.\par Director, Women's Liver Health Program, [facility]\par Transplant Hepatology, [facility]\par  of Medicine\par [employer] [facility] [employer] [facility]\par [address]\[place] [address]\par Tel: [phone]\par Fax: [phone]\par Cell: [phone]\par E-mail: [email or web address]

## 2023-03-10 NOTE — PHYSICAL EXAM
[General Appearance - Alert] : alert [General Appearance - In No Acute Distress] : in no acute distress [General Appearance - Well Nourished] : well nourished [Sclera] : the sclera and conjunctiva were normal [Neck Cervical Mass (___cm)] : no neck mass was observed [] : no respiratory distress [Exaggerated Use Of Accessory Muscles For Inspiration] : no accessory muscle use [Heart Rate And Rhythm] : heart rate was normal and rhythm regular [Edema] : there was no peripheral edema [Veins - Varicosity Changes] : there were no varicosital changes [Bowel Sounds] : normal bowel sounds [Cervical Lymph Nodes Enlarged Posterior Bilaterally] : posterior cervical [Supraclavicular Lymph Nodes Enlarged Bilaterally] : supraclavicular [No CVA Tenderness] : no ~M costovertebral angle tenderness [Abnormal Walk] : normal gait [Nail Clubbing] : no clubbing  or cyanosis of the fingernails [Involuntary Movements] : no involuntary movements were seen [Skin Color & Pigmentation] : normal skin color and pigmentation [No Focal Deficits] : no focal deficits [Oriented To Time, Place, And Person] : oriented to person, place, and time [Scleral Icterus] : No Scleral Icterus [Abdominal  Ascites] : no ascites [Asterixis] : no asterixis observed [Depression] : no depression

## 2023-03-10 NOTE — HISTORY OF PRESENT ILLNESS
[FreeTextEntry1] : Ms. TRINY PADILLA is 52-year-old female who is being seen for follow up visit with biopsy-proven WARNER with concern for possible compensated cirrhosis.  \par \par PH/o obesity, NIDDM2 (diagnosed in ), borderline hypertriglyceridemia, depression, chronic constipation.  \par \par MSH/o CRS surgery due to "scar" from prior hemorrhoidectomy. She was also recently started on rifampin 600 mg po daily since 2021 by her PCP for latent TB. Multiple left elbow surgeries after prior mechanical fall. \par \par FH/o Father  of gastric cancer. Paternal uncle  of colon cancer (diagnosed in his 60s). Mother is alive, and had uterine cancer. Siblings and her daughter have arthritis. Denies family history of diabetes, heart disease, liver disease or liver cancer. \par +SH/o drinks alcohol on special occasions, typically beer and wine, 0-1 times/week.  \par \par Denies H/o Alcohol abuse, never smoked or any recreational drug uses. Not currently working. She is . She lives with her sister-in-law and her three children. She has a 30+ year-old daughter who lives in [place] and one grandchild. She moved from [facility] [place] age 23. \par \par Right lobe of liver, biopsy (21): +Steatohepatitis, steatosis - 1/3; lobular inflammation - 2/3; ballooning - 2/2; and fibrosis 3-4/4. \par FibroScan (3/12/21): Median liver stiffness 32.5 kPA (F4 fibrosis) and  dB/m (S1 steatosis). \par EGD (21) No EV, + Diffuse mild gastric inflammation. \par \par WNL-US ab on 07/15/2022: Heterogeneous echotexture to liver. \par Labs on 2022: Isolated , A1C 8.3%, INR 1.29, normalized AST 30, WDL-DBil, AFP, Lipids, CBC.  \par \par Abdominal US (21, [facility]): Mildly enlarged liver with diffuse fatty infiltration. + Cirrhosis.   \par CTAP with IVC (3/22/21): The liver is fatty liver and cirrhosis. +Small renal cysts. \par \par PCP: Dr. Triny Torres, GI: Dr. Susana Guo. \par \par

## 2023-03-21 NOTE — H&P PST ADULT - NS MD HP PULSE RADIAL
[de-identified] : 10/22: LVEF 60%, DD1, e' sept: 0.06 m/s, mild sclerosis, mild MR \par GLS -16.3% 
right normal/left normal

## 2023-03-23 ENCOUNTER — NON-APPOINTMENT (OUTPATIENT)
Age: 53
End: 2023-03-23

## 2023-04-17 NOTE — PATIENT PROFILE ADULT - NSTOBACCONEVERSMOKERY/N_GEN_A
Lives alone will take self home. Independent with no homecare needs. CM role explained and DC planning discussed.  Yes No

## 2023-08-28 NOTE — H&P PST ADULT - OCCUPATION
Department of Emergency Medicine  FIRST PROVIDER TRIAGE NOTE             Independent MLP           1/28/23  2:48 PM EST    Date of Encounter: 1/28/23   MRN: [id number]      HPI: Jere Vann is a 60 y.o. female who presents to the ED for Headache (States symptoms started Wednesday and was seen here at that time, pt states she followed up with PCP and was started on nifedipine and HCTZ, states is still having same syptoms), Nausea, Shortness of Breath, and Tachycardia       ROS: Negative for fever or cough. PE: Gen Appearance/Constitutional: anxious, alert  CV: tachycardia     Initial Plan of Care: All treatment areas with department are currently occupied. Plan to order/Initiate the following while awaiting opening in ED: labs, EKG, and imaging studies.   Initiate Treatment-Testing, Proceed toTreatment Area When Bed Available for ED Attending/MLP to Continue Care    Electronically signed by JEN Barreto CNP   DD: 1/28/23 LOV  8/3/23  RTC  3 Months    F/U  11/10/23 Not working at present

## 2023-09-06 NOTE — H&P PST ADULT - VISION (WITH CORRECTIVE LENSES IF THE PATIENT USUALLY WEARS THEM):
Patient seen for hearing aid evaluation. Patient did contact his insurance provider and was told he does not have hearing aid benefits. He would like to pursue hearing aids, through this facility, on a self-pay basis. Hearing aids ordered/confirmation received and scanned into [facility] chart. Patient scheduled for a hearing aid fitting, on 9/21/23.     James Kent CCC/JUAN  Audiologist  [id number]  NPI#:  [id number] Normal vision: sees adequately in most situations; can see medication labels, newsprint

## 2023-09-07 ENCOUNTER — APPOINTMENT (OUTPATIENT)
Dept: HEPATOLOGY | Facility: CLINIC | Age: 53
End: 2023-09-07

## 2024-04-09 ENCOUNTER — APPOINTMENT (OUTPATIENT)
Dept: HEPATOLOGY | Facility: CLINIC | Age: 54
End: 2024-04-09
Payer: COMMERCIAL

## 2024-04-09 VITALS
TEMPERATURE: 98.2 F | BODY MASS INDEX: 32.65 KG/M2 | HEIGHT: 65 IN | DIASTOLIC BLOOD PRESSURE: 70 MMHG | RESPIRATION RATE: 16 BRPM | WEIGHT: 196 LBS | OXYGEN SATURATION: 97 % | SYSTOLIC BLOOD PRESSURE: 113 MMHG | HEART RATE: 79 BPM

## 2024-04-09 PROCEDURE — 99214 OFFICE O/P EST MOD 30 MIN: CPT

## 2024-04-09 RX ORDER — SODIUM FLUORIDE1.1%, POTASSIUM NITRATE 5% 5.8; 57.5 MG/ML; MG/ML
1.1-5 GEL, DENTIFRICE DENTAL
Qty: 100 | Refills: 0 | Status: DISCONTINUED | COMMUNITY
Start: 2022-05-17 | End: 2024-04-09

## 2024-04-09 RX ORDER — HYDROCORTISONE 25 MG/G
2.5 CREAM TOPICAL
Qty: 28 | Refills: 0 | Status: DISCONTINUED | COMMUNITY
Start: 2022-03-08 | End: 2024-04-09

## 2024-04-09 RX ORDER — HYDROXYZINE HYDROCHLORIDE 10 MG/1
10 TABLET ORAL DAILY
Refills: 0 | Status: ACTIVE | COMMUNITY

## 2024-04-09 RX ORDER — HYDROCORTISONE 25 MG/G
2.5 CREAM TOPICAL
Qty: 30 | Refills: 0 | Status: DISCONTINUED | COMMUNITY
Start: 2022-06-15 | End: 2024-04-09

## 2024-04-09 RX ORDER — METFORMIN HYDROCHLORIDE 850 MG/1
850 TABLET, COATED ORAL 3 TIMES DAILY
Refills: 0 | Status: DISCONTINUED | COMMUNITY
End: 2024-04-09

## 2024-04-09 RX ORDER — ONDANSETRON 4 MG/1
4 TABLET, ORALLY DISINTEGRATING ORAL
Qty: 18 | Refills: 0 | Status: DISCONTINUED | COMMUNITY
Start: 2022-05-18 | End: 2024-04-09

## 2024-04-09 RX ORDER — GABAPENTIN 100 MG
100 TABLET ORAL TWICE DAILY
Refills: 0 | Status: ACTIVE | COMMUNITY

## 2024-04-09 RX ORDER — SODIUM SULFATE, POTASSIUM SULFATE, MAGNESIUM SULFATE 17.5; 3.13; 1.6 G/ML; G/ML; G/ML
17.5-3.13-1.6 SOLUTION, CONCENTRATE ORAL
Qty: 354 | Refills: 0 | Status: DISCONTINUED | COMMUNITY
Start: 2021-10-26 | End: 2024-04-09

## 2024-04-09 RX ORDER — RIFAMPIN 300 MG/1
300 CAPSULE ORAL
Qty: 60 | Refills: 0 | Status: DISCONTINUED | COMMUNITY
Start: 2022-05-09 | End: 2024-04-09

## 2024-04-09 RX ORDER — SEMAGLUTIDE 1.34 MG/ML
2 INJECTION, SOLUTION SUBCUTANEOUS
Qty: 1.5 | Refills: 0 | Status: DISCONTINUED | COMMUNITY
Start: 2022-06-30 | End: 2024-04-09

## 2024-04-09 RX ORDER — ESCITALOPRAM OXALATE 20 MG/1
20 TABLET ORAL DAILY
Refills: 0 | Status: ACTIVE | COMMUNITY

## 2024-04-09 RX ORDER — CYCLOBENZAPRINE HYDROCHLORIDE 5 MG/1
5 TABLET, FILM COATED ORAL
Refills: 0 | Status: ACTIVE | COMMUNITY

## 2024-04-09 RX ORDER — GLIMEPIRIDE 2 MG/1
2 TABLET ORAL TWICE DAILY
Refills: 0 | Status: ACTIVE | COMMUNITY

## 2024-04-09 RX ORDER — TRAZODONE HYDROCHLORIDE 50 MG/1
50 TABLET ORAL
Qty: 30 | Refills: 5 | Status: ACTIVE | COMMUNITY

## 2024-04-09 RX ORDER — NITROGLYCERIN 4 MG/G
0.4 OINTMENT RECTAL
Qty: 30 | Refills: 0 | Status: DISCONTINUED | COMMUNITY
Start: 2022-02-15 | End: 2024-04-09

## 2024-04-09 NOTE — HISTORY OF PRESENT ILLNESS
[de-identified] : Ms. 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  PCP: Dr. Jaylene Torres GI: Dr. Susana Guo = referring physician  FibroScan (3/12/21): Median liver stiffness 32.5 kPA (concerning for F4 fibrosis) and CAP score 309 dB/m (consistent with S1 steatosis).  CT abdomen/pelvis with contrast (3/22/21): The liver is fatty with a slightly nodular surface and hypertrophy of the caudate and left lateral segment, consistent with cirrhosis. No focal liver lesion is noted. Normal spleen, biliary tree, and gallbladder. No ascites. Small renal cysts.  EGD (4/30/21): No esophageal varices. Diffuse mild gastric inflammation, biopsied. Normal duodenum.  Abdominal US (7/26/21, [facility] Mildly enlarged liver with increased echogenicity compatible with diffuse fatty infiltration. Subtle nodular contour to the liver as seen on CT scan, compatible with cirrhosis. Normal spleen size (12.2 cm). No ascites. Normal caliber bile ducts. No cholelithiasis.  Right lobe of liver, biopsy (8/12/21): Steatohepatitis, see note. - Note: There is mild macrovesicular steatosis, ballooning change and Emily bodies. There is moderate portal and lobular inflammation with focal interface activity; a few acidophil bodies are present. There are no granulomas and there is no cholestasis. The trichrome stain shows widespread bridging fibrosis and focal nodule formation; foci of pericellular/perisinusoidal fibrosis are also present. Stainable iron is not increased. There are no PASD globules in hepatocytes. The reticulin stain confirms the areas of fibrosis and collapsed reticulin. - The WARNER CRN scoring system is as follows: steatosis - 1/3; lobular inflammation - 2/3; ballooning - 2/2; and fibrosis 3-4/4.  She was last seen in this office on 3/9/23 (>1 year ago) and is here today for routine though delayed follow-up. She had a recent colonoscopy with her GI Dr. Guo about 2 months. Her last EGD was the one in 2021. Her weight has been up and down but she is not losing weight consistently despite still being on Ozempic at 0.5 mg subcutaneously weekly. She has been on that dose for >1 year prescribed by her PCP Dr. Torres. She is awaiting an appointment with an endocrinologist in a few months. She walks for exercise, typically 15-20 minutes almost daily when it is nice weather. She drinks alcohol socially, typically wine or beer, up to 4 at a time about 2 times/month. No overt GI bleeding, abdominal distension, lower extremity swelling, dyspnea, confusion, or mental fogginess.  She has no known family history of liver disease.  Never smoker. No recreational drug use. She is on disability after a prior fall. She lives with her sister-in-law and her , but she and her  are . Her 33-year-old daughter and granddaughter live in [place].

## 2024-04-09 NOTE — ASSESSMENT
[FreeTextEntry1] : # Metabolic dysfunction-associated steatohepatitis (MASH) with advanced fibrosis: - Prior laboratory work-up was notable for positive SUSIE with 1:320 titer and positive ASMA with 1:20 titer, but her liver biopsy confirmed MASH and did not show histologic evidence of autoimmune hepatitis. Based on the biopsy (8/12/21), she had advanced fibrosis (F3-4) including early nodule formation. There was also concern for cirrhotic morphology on prior CT (3/2021) and US (7/2021), though not definitely seen on her last US (7/15/22). She does not have any history of overt hepatic decompensations (i.e., no history of jaundice, ascites, variceal hemorrhage, or hepatic encephalopathy) nor any confirmed portal hypertension, including no upper GI varices on last EGD (4/30/21). - Repeat labs, ultrasound, and FibroScan ordered today for disease surveillance. - Based on the above results, at her next visit we will discuss whether to start resmetirom which was recently FDA approved for MASH with fibrosis. She is also continuing on semaglutide (Ozempic) for obesity and NIDDM2 and we previously discussed that, off label, this may also help with MASH though, unlike resmetirom, has not yet been proven to lead to regression of fibrosis. - She is overdue for q6 month screening for hepatocellular carcinoma (HCC) with US and AFP ordered today as above. - We have discussed that lifestyle modifications are crucial for management of MASH. I recommended gradual weight loss. I recommended dietary changes including coffee consumption (up to 3-4 cups/day if desired), adherence to a Mediterranean style diet with increased consumption of vegetables and lean proteins, avoidance of red meat and high fructose corn syrup, and avoidance of calorie-containing beverages. The plate method (1/4 plate lean proteins, 1/4 plate starch, and 1/2 plate raw or cooked vegetables) was previously discussed. I previously referred her to nutritionist Uma Coleman through the [facility] for additional, personalized dietary counseling, but this was not a covered service for her. I recommended moderate intensity exercise for a minimum of 20-30 minutes at least 3 times per week. These changes have been shown to lead to regression or even resolution of steatosis, inflammation, and even fibrosis in some patients.  # Health maintenance: - She is HAV immune but HBV non-immune and would benefit from vaccinations. - Given concern for possible cirrhosis, Ms. PADILLA was counseled to: abstain from alcohol and all illicit drugs; avoid use of herbal and dietary supplements due to potential hepatotoxicity; limit use of acetaminophen to <2 grams per day; avoid use of nonsteroidal antiinflammatory drugs (NSAIDs) as these can lead to diuretic resistance and precipitate renal dysfunction in patients with advanced liver disease; avoid eating any unpasteurized dairy products; avoid eating any raw or undercooked eggs, fish, poultry, or meat; and avoid eating raw/steamed oysters or other shellfish to avoid risk of Vibrio infection. - She underwent colonoscopy with her GI Dr. Guo in early 2024.  Next follow-up: 6/18/24 with same-day FibroScan

## 2024-04-09 NOTE — PHYSICAL EXAM
[Non-Tender] : non-tender [Smooth] : smooth [General Appearance - Alert] : alert [General Appearance - In No Acute Distress] : in no acute distress [General Appearance - Well Nourished] : well nourished [General Appearance - Well Developed] : well developed [General Appearance - Well-Appearing] : healthy appearing [Sclera] : the sclera and conjunctiva were normal [Hearing Threshold Finger Rub Not [place]] : hearing was normal [Oropharynx] : the oropharynx was normal [Neck Appearance] : the appearance of the neck was normal [Neck Cervical Mass (___cm)] : no neck mass was observed [Jugular Venous Distention Increased] : there was no jugular-venous distention [Respiration, Rhythm And Depth] : normal respiratory rhythm and effort [Exaggerated Use Of Accessory Muscles For Inspiration] : no accessory muscle use [Auscultation Breath Sounds / Voice Sounds] : lungs were clear to auscultation bilaterally [Heart Rate And Rhythm] : heart rate was normal and rhythm regular [Heart Sounds] : normal S1 and S2 [Edema] : there was no peripheral edema [Bowel Sounds] : normal bowel sounds [Abdomen Soft] : soft [Abdomen Tenderness] : non-tender [Abdomen Mass (___ Cm)] : no abdominal mass palpated [Abnormal Walk] : normal gait [Nail Clubbing] : no clubbing  or cyanosis of the fingernails [Involuntary Movements] : no involuntary movements were seen [Skin Color & Pigmentation] : normal skin color and pigmentation [Skin Turgor] : normal skin turgor [] : no rash [Oriented To Time, Place, And Person] : oriented to person, place, and time [Impaired Insight] : insight and judgment were intact [Affect] : the affect was normal [Mood] : the mood was normal [Memory Recent] : recent memory was not impaired [Memory Remote] : remote memory was not impaired [Scleral Icterus] : No Scleral Icterus [Spider Angioma] : No spider angioma(s) were observed [Abdominal  Ascites] : no ascites [Splenomegaly] : no splenomegaly [Caput Medusae] : no caput medusae observed [Asterixis] : no asterixis observed [Jaundice] : No jaundice [Palmar Erythema] : no Palmar Erythema [FreeTextEntry1] : +healed surgical scar, +central adiposity

## 2024-04-12 LAB
AFP-TM SERPL-MCNC: 5.8 NG/ML
ALBUMIN SERPL ELPH-MCNC: 3.9 G/DL
ALP BLD-CCNC: 121 U/L
ALT SERPL-CCNC: 40 U/L
ANION GAP SERPL CALC-SCNC: 12 MMOL/L
AST SERPL-CCNC: 37 U/L
BASOPHILS # BLD AUTO: 0.07 K/UL
BASOPHILS NFR BLD AUTO: 0.7 %
BILIRUB SERPL-MCNC: 0.4 MG/DL
BUN SERPL-MCNC: 14 MG/DL
CALCIUM SERPL-MCNC: 9 MG/DL
CHLORIDE SERPL-SCNC: 102 MMOL/L
CHOLEST SERPL-MCNC: 184 MG/DL
CO2 SERPL-SCNC: 24 MMOL/L
CREAT SERPL-MCNC: 0.88 MG/DL
EGFR: 79 ML/MIN/1.73M2
EOSINOPHIL # BLD AUTO: 0.14 K/UL
EOSINOPHIL NFR BLD AUTO: 1.4 %
ESTIMATED AVERAGE GLUCOSE: 143 MG/DL
GLUCOSE SERPL-MCNC: 83 MG/DL
HBA1C MFR BLD HPLC: 6.6 %
HCT VFR BLD CALC: 41.7 %
HDLC SERPL-MCNC: 94 MG/DL
HGB BLD-MCNC: 14.2 G/DL
IMM GRANULOCYTES NFR BLD AUTO: 0.6 %
INR PPP: 1.21 RATIO
LDLC SERPL CALC-MCNC: 68 MG/DL
LYMPHOCYTES # BLD AUTO: 3.24 K/UL
LYMPHOCYTES NFR BLD AUTO: 32.6 %
MAN DIFF?: NORMAL
MCHC RBC-ENTMCNC: 31.1 PG
MCHC RBC-ENTMCNC: 34.1 GM/DL
MCV RBC AUTO: 91.4 FL
MONOCYTES # BLD AUTO: 0.91 K/UL
MONOCYTES NFR BLD AUTO: 9.2 %
NEUTROPHILS # BLD AUTO: 5.52 K/UL
NEUTROPHILS NFR BLD AUTO: 55.5 %
NONHDLC SERPL-MCNC: 89 MG/DL
PLATELET # BLD AUTO: 203 K/UL
POTASSIUM SERPL-SCNC: 4.4 MMOL/L
PROT SERPL-MCNC: 6.7 G/DL
PT BLD: 13.6 SEC
RBC # BLD: 4.56 M/UL
RBC # FLD: 13.7 %
SODIUM SERPL-SCNC: 138 MMOL/L
TRIGL SERPL-MCNC: 126 MG/DL
WBC # FLD AUTO: 9.94 K/UL

## 2024-06-19 ENCOUNTER — APPOINTMENT (OUTPATIENT)
Dept: HEPATOLOGY | Facility: CLINIC | Age: 54
End: 2024-06-19
Payer: COMMERCIAL

## 2024-06-19 VITALS
OXYGEN SATURATION: 97 % | HEIGHT: 65 IN | BODY MASS INDEX: 32.15 KG/M2 | SYSTOLIC BLOOD PRESSURE: 124 MMHG | HEART RATE: 69 BPM | TEMPERATURE: 97.5 F | WEIGHT: 193 LBS | DIASTOLIC BLOOD PRESSURE: 88 MMHG

## 2024-06-19 DIAGNOSIS — R74.01 ELEVATION OF LEVELS OF LIVER TRANSAMINASE LEVELS: ICD-10-CM

## 2024-06-19 DIAGNOSIS — K74.60 NONALCOHOLIC STEATOHEPATITIS (NASH): ICD-10-CM

## 2024-06-19 DIAGNOSIS — Z00.00 ENCOUNTER FOR GENERAL ADULT MEDICAL EXAMINATION W/OUT ABNORMAL FINDINGS: ICD-10-CM

## 2024-06-19 DIAGNOSIS — K74.00 HEPATIC FIBROSIS, UNSPECIFIED: ICD-10-CM

## 2024-06-19 DIAGNOSIS — K75.81 NONALCOHOLIC STEATOHEPATITIS (NASH): ICD-10-CM

## 2024-06-19 DIAGNOSIS — E11.9 TYPE 2 DIABETES MELLITUS W/OUT COMPLICATIONS: ICD-10-CM

## 2024-06-19 PROCEDURE — 99214 OFFICE O/P EST MOD 30 MIN: CPT

## 2024-06-19 PROCEDURE — 76981 USE PARENCHYMA: CPT

## 2024-06-19 RX ORDER — SEMAGLUTIDE 1.34 MG/ML
2 INJECTION, SOLUTION SUBCUTANEOUS WEEKLY
Refills: 0 | Status: ACTIVE | COMMUNITY

## 2024-06-19 NOTE — PHYSICAL EXAM
[Non-Tender] : non-tender [Smooth] : smooth [General Appearance - Alert] : alert [General Appearance - In No Acute Distress] : in no acute distress [General Appearance - Well Nourished] : well nourished [General Appearance - Well Developed] : well developed [General Appearance - Well-Appearing] : healthy appearing [Sclera] : the sclera and conjunctiva were normal [Hearing Threshold Finger Rub Not [place]] : hearing was normal [Oropharynx] : the oropharynx was normal [Neck Appearance] : the appearance of the neck was normal [Jugular Venous Distention Increased] : there was no jugular-venous distention [Neck Cervical Mass (___cm)] : no neck mass was observed [Respiration, Rhythm And Depth] : normal respiratory rhythm and effort [Exaggerated Use Of Accessory Muscles For Inspiration] : no accessory muscle use [Auscultation Breath Sounds / Voice Sounds] : lungs were clear to auscultation bilaterally [Heart Rate And Rhythm] : heart rate was normal and rhythm regular [Heart Sounds] : normal S1 and S2 [Edema] : there was no peripheral edema [Bowel Sounds] : normal bowel sounds [Abdomen Soft] : soft [Abdomen Tenderness] : non-tender [Abdomen Mass (___ Cm)] : no abdominal mass palpated [Abnormal Walk] : normal gait [Nail Clubbing] : no clubbing  or cyanosis of the fingernails [Involuntary Movements] : no involuntary movements were seen [Skin Turgor] : normal skin turgor [Skin Color & Pigmentation] : normal skin color and pigmentation [] : no rash [Oriented To Time, Place, And Person] : oriented to person, place, and time [Impaired Insight] : insight and judgment were intact [Affect] : the affect was normal [Mood] : the mood was normal [Memory Recent] : recent memory was not impaired [Memory Remote] : remote memory was not impaired [Scleral Icterus] : No Scleral Icterus [Spider Angioma] : No spider angioma(s) were observed [Abdominal  Ascites] : no ascites [Splenomegaly] : no splenomegaly [Caput Medusae] : no caput medusae observed [Asterixis] : no asterixis observed [Jaundice] : No jaundice [Palmar Erythema] : no Palmar Erythema [FreeTextEntry1] : +healed surgical scar, +central adiposity

## 2024-06-19 NOTE — HISTORY OF PRESENT ILLNESS
[de-identified] : Ms. Rizo is a 55 yo F with obesity, NIDDM2, hypertriglyceridemia, depression, anxiety, insomnia, chronic back pain, chronic constipation, history of latent TB (s/p completed treatment with rifampin in [id number]), multiple left elbow surgeries after a prior mechanical fall, history of hemorrhoidectomy, and metabolic dysfunction-associated steatohepatitis (MASH) with advanced hepatic fibrosis (F3-4 on a percutaneous liver biopsy on 8/12/21) and radiologic evidence of compensated cirrhosis.  PCP: Dr. Jaylene Torres GI: Dr. Susana Guo = referring physician  FibroScan (3/12/21): Median liver stiffness 32.5 kPA (concerning for F4 fibrosis) and CAP score 309 dB/m (consistent with S1 steatosis).  Right lobe of liver, biopsy (8/12/21): Steatohepatitis, see note. - Note: There is mild macrovesicular steatosis, ballooning change and Emily bodies. There is moderate portal and lobular inflammation with focal interface activity; a few acidophil bodies are present. There are no granulomas and there is no cholestasis. The trichrome stain shows widespread bridging fibrosis and focal nodule formation; foci of pericellular/perisinusoidal fibrosis are also present. Stainable iron is not increased. There are no PASD globules in hepatocytes. The reticulin stain confirms the areas of fibrosis and collapsed reticulin. - The WARNER CRN scoring system is as follows: steatosis - 1/3; lobular inflammation - 2/3; ballooning - 2/2; and fibrosis 3-4/4.  FibroScan (6/19/24): Median liver stiffness 16.4 kPA (concerning for F4 fibrosis) and CAP score 216 dB/m (consistent with S0 steatosis).  She was last seen in this office on 4/9/24 and is here today for routine follow-up and FibroScan (above). She has lost 3 lbs since her last visit. She is still on Ozempic (prescribed by her PCP Dr. Torres), currently doing 1 mg subcutaneously weekly (by doing two 0.5 mg subcutaneous shots weekly for now). She has been on this dose for 2-3 months and tolerating it well. She is walking for exercise. She is still awaiting a new endocrinologist appointment as she had to re-schedule the last one due to being in [place]. She is still drinking alcohol socially, typically wine or beer, up to 4 at a time about 2 times/month (every 2 weeks or so). No overt GI bleeding, abdominal distension, lower extremity swelling, dyspnea, confusion, or mental fogginess.  She has no known family history of liver disease.  Never smoker. No recreational drug use. She is on disability after a prior fall. She lives with her sister-in-law and her , but she and her  are . Her adult daughter and granddaughter live in [place].

## 2024-06-19 NOTE — ASSESSMENT
[FreeTextEntry1] : # Compensated MetALD cirrhosis: - Prior laboratory work-up was notable for positive SUSIE with 1:320 titer and positive ASMA with 1:20 titer, but her liver biopsy confirmed steatohepatitis and did not show histologic evidence of autoimmune hepatitis. Based on the biopsy (8/12/21), she had advanced fibrosis (F3-4) including early nodule formation. She has cirrhotic morphology on radiologic studies confirming early cirrhosis, as also suggested based on repeat FibroScan today (6/19/24). She does not have any history of overt hepatic decompensations (i.e., no history of jaundice, ascites, variceal hemorrhage, or hepatic encephalopathy) nor any confirmed portal hypertension, including no upper GI varices on last EGD (4/30/21). - Repeat labs ordered today for q6 month surveillance of her cirrhosis, next due in 10/2024. - Continue q6 month screening for hepatocellular carcinoma (HCC) with US next due in 11/2024 and ordered today. We will also trend AFP with her labs every 6 months. - Based on recent platelet count >150k and FibroScan liver stiffness <20 kPa (6/19/24), she is at low risk of variceal hemorrhage and does not need repeat EGD at this time. We will repeat FibroScan annually (next in 6/2025) and consider starting carvedilol if labs, imaging, or FibroScan findings change in such a manner as to suggest clinically significant portal hypertension. - We have discussed that lifestyle modifications are crucial for management of MASH. I recommended gradual weight loss. I recommended dietary changes including coffee consumption (up to 3-4 cups/day if desired), adherence to a Mediterranean style diet with increased consumption of vegetables and lean proteins, avoidance of red meat and high fructose corn syrup, and avoidance of calorie-containing beverages. The plate method (1/4 plate lean proteins, 1/4 plate starch, and 1/2 plate raw or cooked vegetables) was previously discussed. I previously referred her to nutritionist Uma Coleman through the [facility] for additional, personalized dietary counseling, but this was not a covered service for her. I recommended moderate intensity exercise for a minimum of 20-30 minutes at least 3 times per week. These changes have been shown to lead to regression or even resolution of steatosis, inflammation, and even fibrosis in some patients. - Today, an emphasis was placed on the need for complete alcohol abstinence and that her "binge" pattern alcohol drinking up to twice/month can significantly increase her short- and long-term liver-related risks including of alcohol-associated hepatitis, decompensated cirrhosis, and death as well as impact future liver transplant candidacy. - Okay to continue semaglutide (Ozempic) prescribed by her PCP for obesity and NIDDM2. - We discussed today that she is not a candidate for resmetirom due to early cirrhosis.  # Health maintenance: - She is HAV immune but HBV non-immune and would benefit from vaccinations. - Given cirrhosis, Ms. PADILLA was counseled to: abstain from alcohol and all illicit drugs; avoid use of herbal and dietary supplements due to potential hepatotoxicity; limit use of acetaminophen to <2 grams per day; avoid use of nonsteroidal antiinflammatory drugs (NSAIDs) as these can lead to diuretic resistance and precipitate renal dysfunction in patients with advanced liver disease; avoid eating any unpasteurized dairy products; avoid eating any raw or undercooked eggs, fish, poultry, or meat; and avoid eating raw/steamed oysters or other shellfish to avoid risk of Vibrio infection. - She underwent colonoscopy with her GI Dr. Guo in early 2024 and will continue to follow-up with her when indicated.  Next follow-up: 6 months

## 2024-06-21 PROBLEM — K75.81 LIVER CIRRHOSIS SECONDARY TO NASH: Status: ACTIVE | Noted: 2024-06-19

## 2024-06-21 PROBLEM — E11.9 TYPE 2 DIABETES MELLITUS WITHOUT COMPLICATION, WITHOUT LONG-TERM CURRENT USE OF INSULIN: Status: ACTIVE | Noted: 2021-07-19

## 2024-10-12 NOTE — PATIENT PROFILE ADULT - NSSTREETDRUGUSE_GEN_A_NUR
Resistant >16 mcg/mL Resistant     cefepime 16 mcg/mL Resistant <=2 mcg/mL Sensitive     cefOXitin >16 mcg/mL Resistant >16 mcg/mL Resistant     cefTRIAXone 32 mcg/mL Resistant <=1 mcg/mL Sensitive     cefuroxime >16 mcg/mL Resistant 16 mcg/mL Intermediate     ciprofloxacin <=0.25 mcg/mL Sensitive <=0.25 mcg/mL Sensitive     ertapenem <=0.5 mcg/mL Sensitive <=0.5 mcg/mL Sensitive     gentamicin <=2 mcg/mL Sensitive <=2 mcg/mL Sensitive     levofloxacin <=0.5 mcg/mL Sensitive <=0.5 mcg/mL Sensitive     meropenem <=1 mcg/mL Sensitive <=1 mcg/mL Sensitive     piperacillin-tazobactam 16 mcg/mL Intermediate <=8 mcg/mL Sensitive     trimethoprim-sulfamethoxazole <=0.5/9.5 m... Sensitive <=0.5/9.5 m... Sensitive                     Viral Culture:    No results found for: \"COVID19\"  Urine Culture: No results for input(s): \"LABURIN\" in the last 72 hours.    Scheduled Meds:   nitroGLYCERIN  1 patch TransDERmal Daily    meropenem  1,000 mg IntraVENous Q8H    vancomycin  1,500 mg IntraVENous Q12H    vancomycin (VANCOCIN) intermittent dosing (placeholder)   Other RX Placeholder    lisinopril  20 mg Oral Daily    collagenase   Topical Daily    sodium chloride flush  5-40 mL IntraVENous 2 times per day    enoxaparin  30 mg SubCUTAneous BID    clonazePAM  1 mg Oral Nightly    pantoprazole  40 mg Oral QAM AC    propranolol  40 mg Oral BID    rosuvastatin  10 mg Oral Nightly    traZODone  200 mg Oral Nightly    insulin lispro  0-16 Units SubCUTAneous 4x Daily AC & HS       Continuous Infusions:   sodium chloride      dextrose         PRN Meds:  sodium chloride flush, sodium chloride, potassium chloride **OR** potassium alternative oral replacement **OR** potassium chloride, magnesium sulfate, ondansetron **OR** ondansetron, polyethylene glycol, acetaminophen **OR** acetaminophen, glucose, dextrose bolus **OR** dextrose bolus, glucagon (rDNA), dextrose    Imaging:   XR CHEST PORTABLE   Final Result   Left subclavian dual chamber  patient/Family and Nursing     Medical Decision Making:  The following items were considered in medical decision making:  Discussion of patient care with other providers  Reviewed clinical lab tests  Reviewed radiology tests  Reviewed other diagnostic tests/interventions  Independent review of radiologic images  Independent review of  Microbiology cultures and other micro tests reviewed     Risk of Complications/Morbidity: High      Illness(es)/ Infection present that pose threat to bodily function.   There is potential for severe exacerbation of infection/side effects of treatment.  Therapy requires intensive monitoring for antimicrobial agent toxicity.     Thanks for allowing me to participate in your patient's care please call me with any questions or concerns.    Dr. Adan Sheth MD  Infectious Disease  [facility]  Phone: [phone]   Fax : [phone]     never used

## 2025-01-30 ENCOUNTER — APPOINTMENT (OUTPATIENT)
Dept: HEPATOLOGY | Facility: CLINIC | Age: 55
End: 2025-01-30